# Patient Record
Sex: FEMALE | Race: WHITE | Employment: FULL TIME | URBAN - METROPOLITAN AREA
[De-identification: names, ages, dates, MRNs, and addresses within clinical notes are randomized per-mention and may not be internally consistent; named-entity substitution may affect disease eponyms.]

---

## 2019-04-25 ENCOUNTER — OFFICE VISIT (OUTPATIENT)
Dept: SURGERY | Age: 49
End: 2019-04-25
Payer: COMMERCIAL

## 2019-04-25 VITALS
OXYGEN SATURATION: 100 % | BODY MASS INDEX: 22.99 KG/M2 | SYSTOLIC BLOOD PRESSURE: 104 MMHG | DIASTOLIC BLOOD PRESSURE: 76 MMHG | TEMPERATURE: 97.5 F | HEIGHT: 65 IN | WEIGHT: 138 LBS | HEART RATE: 51 BPM

## 2019-04-25 DIAGNOSIS — Z12.39 BREAST CANCER SCREENING, HIGH RISK PATIENT: ICD-10-CM

## 2019-04-25 DIAGNOSIS — R92.2 DENSE BREAST TISSUE ON MAMMOGRAM: ICD-10-CM

## 2019-04-25 DIAGNOSIS — N63.0 BREAST MASS: ICD-10-CM

## 2019-04-25 DIAGNOSIS — N64.4 BREAST PAIN: Primary | ICD-10-CM

## 2019-04-25 PROCEDURE — 99203 OFFICE O/P NEW LOW 30 MIN: CPT | Performed by: SURGERY

## 2019-04-25 PROCEDURE — G8427 DOCREV CUR MEDS BY ELIG CLIN: HCPCS | Performed by: SURGERY

## 2019-04-25 PROCEDURE — G8420 CALC BMI NORM PARAMETERS: HCPCS | Performed by: SURGERY

## 2019-04-25 PROCEDURE — 1036F TOBACCO NON-USER: CPT | Performed by: SURGERY

## 2019-04-25 ASSESSMENT — ENCOUNTER SYMPTOMS
SHORTNESS OF BREATH: 0
CHEST TIGHTNESS: 0
ABDOMINAL DISTENTION: 0
COUGH: 0
ABDOMINAL PAIN: 0

## 2019-04-25 NOTE — PROGRESS NOTES
CC:Bilateral dense breasts    HPI:  Maricarmen Aragon is a 53 y.o. woman who presents with concern for extremely dense breasts and bilateral fibrocystic change. She reports cyclic breast pain and new and changing masses each month. She denies skin changes or nipple discharge. Screening mammogram 10/2018 at [facility] BIRADS 2. She has a breast biopsy in the past, right needle biopsy for calcifications, path fibrocystic changes with adenosis and microcalcifications. She does not have a family history of breast or ovarian cancer. Due for colonoscopy, UTD on derm and well woman exams.  is treated at the [facility] for [address] by Dr. Faviola Hoover. She is self employed, in marketing. Based on Tyrer-Cuzik 8.0 risk model, her lifetime risk of breast cancer is 26.2%, calculated by me, today. Breast Imaging 10/11/2018  Breast Density: The breast tissue is extremely dense in the upper outer quadrants, which lowers the sensitivity of mammography. Findings: There are no suspicious masses, microcalcifications, or architectural distortions. Regional and grouped microcalcifications in both breasts are not significantly changed from at least 2015, considered benign. A tissue marker in the upper outer right breast corresponds to the site of previous reportedly benign biopsy. No significant interval change. Menstrual History  Menarche age 16. [id number]  Age first live birth 29  Breastfeeding Yes  Premenopausal, ovaries and uterus intact  Oral contraceptives Yes for 8 years  Hormone replacement No      Past Medical History:   Diagnosis Date    Hypothyroidism      History reviewed. No pertinent surgical history.     Allergies as of 04/25/2019    (No Known Allergies)     Social History     Tobacco Use    Smoking status: Never Smoker    Smokeless tobacco: Never Used   Substance Use Topics    Alcohol use: Not on file    Drug use: Not on file     Review of Systems   Constitutional: Negative for activity change, chills and fever.   Respiratory: Negative for cough, chest tightness and shortness of breath. Cardiovascular: Negative for chest pain and leg swelling. Gastrointestinal: Negative for abdominal distention and abdominal pain. Genitourinary: Negative for difficulty urinating and dysuria. Musculoskeletal: Negative for joint swelling and neck pain. Skin: Negative for pallor and rash. Psychiatric/Behavioral: Negative for dysphoric mood. The patient is not nervous/anxious. Family History of additional cancers:  PATRICIA  of gallbladder cancer at age 76    Current Outpatient Medications on File Prior to Visit   Medication Sig Dispense Refill    levothyroxine (SYNTHROID) 125 MCG tablet Take 125 mcg by mouth daily       No current facility-administered medications on file prior to visit. Exam:  /76   Pulse 51   Temp 97.5 °F (36.4 °C)   Ht 5' 5\" (1.651 m)   Wt 138 lb (62.6 kg)   LMP 2019 (Exact Date)   SpO2 100%   Breastfeeding? No   BMI 22.96 kg/m²   Physical Exam  Constitutional: She appears well-nourished. No apparent distress. Head: Normocephalic and atraumatic. Eyes: EOM are normal. Pupils are equal, round, and reactive to light. Neck: Neck supple. No tracheal deviation present. Cardiovascular: Regular rate and rhythm. Pulmonary: Respirations are non-labored no wheezing. Lymphatics: No palpable cervical, supraclavicular, or axillary lymphadenopathy. Breast: Dense fibrocystic change bilaterally  Right: No masses, nipple discharge, or overlying skin changes. Left: No masses, nipple discharge, or overlying skin changes. There is no axillary lymphadenopathy palpated bilaterally. Skin: No rash noted. Extremities: Well perfused, no edema. Neurologic: Alert and oriented. Assessment and Plan:  High risk for breast cancer-  Based on Tyrer-Cuzik 8.0 risk model, her lifetime risk of breast cancer is 26.2%  Bilateral breast MRI now.    6 month visit with mammogram and exam.  No genetic counseling/testing indicated at this time. Return in about 6 months (around 10/25/2019) for Imaging and examination. All of the patient's questions were answered at this time. Approximately 30 minutes of time were spent in this visit of which 50% or more of the time was related to coordination of care.

## 2019-05-10 ENCOUNTER — HOSPITAL ENCOUNTER (OUTPATIENT)
Dept: MRI IMAGING | Age: 49
Discharge: HOME | End: 2019-05-10
Payer: COMMERCIAL

## 2019-05-10 DIAGNOSIS — N63.0 BREAST MASS: ICD-10-CM

## 2019-05-10 DIAGNOSIS — Z12.39 BREAST CANCER SCREENING, HIGH RISK PATIENT: ICD-10-CM

## 2019-05-10 DIAGNOSIS — R92.2 DENSE BREAST TISSUE ON MAMMOGRAM: ICD-10-CM

## 2019-05-10 DIAGNOSIS — N64.4 BREAST PAIN: ICD-10-CM

## 2019-05-10 PROCEDURE — 77049 MRI BREAST C-+ W/CAD BI: CPT

## 2019-05-10 PROCEDURE — 6360000004 HC RX CONTRAST MEDICATION: Performed by: FAMILY MEDICINE

## 2019-05-10 PROCEDURE — A9579 GAD-BASE MR CONTRAST NOS,1ML: HCPCS | Performed by: FAMILY MEDICINE

## 2019-05-10 RX ADMIN — GADOTERIDOL 12 ML: 279.3 INJECTION, SOLUTION INTRAVENOUS at 13:09

## 2019-05-14 DIAGNOSIS — R92.2 DENSE BREAST TISSUE ON MAMMOGRAM: Primary | ICD-10-CM

## 2019-05-14 DIAGNOSIS — Z12.39 BREAST CANCER SCREENING, HIGH RISK PATIENT: ICD-10-CM

## 2019-05-14 DIAGNOSIS — R92.8 ABNORMAL MAGNETIC RESONANCE IMAGING OF BOTH BREASTS: ICD-10-CM

## 2019-05-17 ENCOUNTER — HOSPITAL ENCOUNTER (OUTPATIENT)
Dept: ULTRASOUND IMAGING | Age: 49
Discharge: HOME | End: 2019-05-17
Payer: COMMERCIAL

## 2019-05-17 DIAGNOSIS — R92.8 ABNORMAL MAGNETIC RESONANCE IMAGING OF BOTH BREASTS: ICD-10-CM

## 2019-05-17 DIAGNOSIS — R92.2 DENSE BREAST TISSUE ON MAMMOGRAM: ICD-10-CM

## 2019-05-17 DIAGNOSIS — Z12.39 BREAST CANCER SCREENING, HIGH RISK PATIENT: ICD-10-CM

## 2019-05-17 PROCEDURE — 76642 ULTRASOUND BREAST LIMITED: CPT

## 2019-10-24 ENCOUNTER — OFFICE VISIT (OUTPATIENT)
Dept: SURGERY | Age: 49
End: 2019-10-24
Payer: COMMERCIAL

## 2019-10-24 ENCOUNTER — HOSPITAL ENCOUNTER (OUTPATIENT)
Dept: MAMMOGRAPHY | Age: 49
Discharge: HOME | End: 2019-10-24
Payer: COMMERCIAL

## 2019-10-24 VITALS
DIASTOLIC BLOOD PRESSURE: 62 MMHG | SYSTOLIC BLOOD PRESSURE: 113 MMHG | BODY MASS INDEX: 21.97 KG/M2 | HEART RATE: 58 BPM | WEIGHT: 132 LBS | OXYGEN SATURATION: 100 %

## 2019-10-24 DIAGNOSIS — Z12.39 BREAST CANCER SCREENING, HIGH RISK PATIENT: Primary | ICD-10-CM

## 2019-10-24 DIAGNOSIS — Z12.39 BREAST CANCER SCREENING, HIGH RISK PATIENT: ICD-10-CM

## 2019-10-24 DIAGNOSIS — Z12.11 COLON CANCER SCREENING: ICD-10-CM

## 2019-10-24 PROCEDURE — G8484 FLU IMMUNIZE NO ADMIN: HCPCS | Performed by: SURGERY

## 2019-10-24 PROCEDURE — 1036F TOBACCO NON-USER: CPT | Performed by: SURGERY

## 2019-10-24 PROCEDURE — G8420 CALC BMI NORM PARAMETERS: HCPCS | Performed by: SURGERY

## 2019-10-24 PROCEDURE — 99213 OFFICE O/P EST LOW 20 MIN: CPT | Performed by: SURGERY

## 2019-10-24 PROCEDURE — G8427 DOCREV CUR MEDS BY ELIG CLIN: HCPCS | Performed by: SURGERY

## 2019-10-24 PROCEDURE — 77063 BREAST TOMOSYNTHESIS BI: CPT

## 2019-10-24 ASSESSMENT — ENCOUNTER SYMPTOMS
COUGH: 0
ABDOMINAL DISTENTION: 0
SHORTNESS OF BREATH: 0
ABDOMINAL PAIN: 0
CHEST TIGHTNESS: 0

## 2020-04-06 ENCOUNTER — TELEPHONE (OUTPATIENT)
Dept: SURGERY | Age: 50
End: 2020-04-06

## 2020-06-10 ENCOUNTER — HOSPITAL ENCOUNTER (OUTPATIENT)
Dept: MRI IMAGING | Age: 50
Discharge: HOME | End: 2020-06-10
Payer: COMMERCIAL

## 2020-06-10 PROCEDURE — 77049 MRI BREAST C-+ W/CAD BI: CPT

## 2020-06-10 PROCEDURE — A9579 GAD-BASE MR CONTRAST NOS,1ML: HCPCS | Performed by: SURGERY

## 2020-06-10 PROCEDURE — 6360000004 HC RX CONTRAST MEDICATION: Performed by: SURGERY

## 2020-06-10 RX ADMIN — GADOTERIDOL 12 ML: 279.3 INJECTION, SOLUTION INTRAVENOUS at 16:00

## 2020-06-24 ENCOUNTER — OFFICE VISIT (OUTPATIENT)
Dept: SURGERY | Age: 50
End: 2020-06-24
Payer: COMMERCIAL

## 2020-06-24 VITALS
SYSTOLIC BLOOD PRESSURE: 105 MMHG | WEIGHT: 128 LBS | DIASTOLIC BLOOD PRESSURE: 69 MMHG | HEART RATE: 58 BPM | TEMPERATURE: 98.2 F | HEIGHT: 65 IN | BODY MASS INDEX: 21.33 KG/M2 | RESPIRATION RATE: 16 BRPM

## 2020-06-24 PROCEDURE — 1036F TOBACCO NON-USER: CPT | Performed by: SURGERY

## 2020-06-24 PROCEDURE — 99213 OFFICE O/P EST LOW 20 MIN: CPT | Performed by: SURGERY

## 2020-06-24 PROCEDURE — G8427 DOCREV CUR MEDS BY ELIG CLIN: HCPCS | Performed by: SURGERY

## 2020-06-24 PROCEDURE — G8420 CALC BMI NORM PARAMETERS: HCPCS | Performed by: SURGERY

## 2020-06-24 ASSESSMENT — ENCOUNTER SYMPTOMS
ABDOMINAL DISTENTION: 0
SHORTNESS OF BREATH: 0
ABDOMINAL PAIN: 0
CHEST TIGHTNESS: 0
COUGH: 0

## 2020-06-24 NOTE — PROGRESS NOTES
CC: High risk follow up    HPI:  Akira Crabtree is a 51 y.o. premenopausal woman here for high risk follow up. She continues to have cyclic breast pain, which is tolerable. She denies new masses, skin changes, or nipple discharge. She has had a breast biopsy in the past, right needle biopsy for calcifications, path fibrocystic changes with adenosis and microcalcifications. She does not have a family history of breast or ovarian cancer. BIRADS 2 mammogram 10/2019. BIRADS 1 Breast MRI 6/10/2020. Due for colonoscopy, and she will make this happen. Referral made again today.  is treated at the [facility] for [address] by Dr. Jae Mendez, doing well. She is self employed, in marketing. Based on Tyrer-Cuzik 8.0 risk model, her lifetime risk of breast cancer is 26.2%. She is now willing to discuss chemoprevention with a medical oncologist.   Mostly plant based diet at this point, lots of exercise also. EXAM: MRI BREAST WITH CONTRAST       INDICATION: Breast cancer screening. High risk patient.        COMPARISON: Bilateral screening mammogram October 24, 2019. MRI breast May 10, 2019. Right breast ultrasound May 17, 2019        TECHNIQUE: Bilateral breast MRI was performed in the prone position using a dedicated breast coil. Kriss Rival was done prior to and after administration of 12 mL ProHance.  Study was evaluated utilizing post-processing software with computer-aided    detection.       RIGHT BREAST:       The right breast is extremely dense.  Right breast demonstrates moderate background parenchymal enhancement.  No enhancing lesion to suggest neoplasm. Enhancing foci within the right breast similar to previous exam and similar to the left breast benign. .    Susceptibility artifact within the outer aspect of the right breast at the level the nipple from biopsy clip.       LEFT BREAST:       The left breast is extremely dense.  Left breast demonstrates moderate background parenchymal enhancement.

## 2020-06-26 ENCOUNTER — TELEPHONE (OUTPATIENT)
Dept: SURGERY | Age: 50
End: 2020-06-26

## 2020-06-26 NOTE — LETTER
[facility] [address] [address] [facility], [address]         p [phone]  f [phone]    Amy Vazquez MD                        ENDOSCOPY ORDER   -- Time of order -- 20    11:45 AM      Facility: [facility]. # _________________                              Scheduled by: ____________       Procedure date & time:      20 at 9:30                                        Pt arrival: 8 am                                                                                        Patient name:  Christina Mayes     :  1970 PCP:  Alejandra Lerma MD       Home phone:    [phone] (home)                                                     PROCEDURE:  Colonoscopy, possible polypectomy         17962    DIAGNOSIS:  Colon cancer screening  Z12.11    Anesthesia: MAC     Time Needed:  30 minutes     Pt Position:  lateral, right side up         Outpatient   X           Pre-Op H & P to be done by: Amy Vazquez MD                       X    PREP:  GoLytley      Medications to be stopped 5 days before surgery: _________    Additional / Special Orders:                                                                                                                        Amy Vazquez MD    Insurance:          [employer]                          ID #                                             Ph #     (secondary ?)       Date called ________     Letitia Landeros to: ___________ @ _______      Precert Needed?  Yes  /  No    [facility] # & Details _______________________________________________________                        ______ Aster Hare to [facility] Authorization Team [phone]                                     ____E-mailed                 _____ Spoke to Pt / Spouse

## 2020-06-26 NOTE — TELEPHONE ENCOUNTER
----- Message from Francine Diggs MD sent at 6/25/2020  5:29 PM EDT -----  Thanks, Cony! I will get her set up for Comunitae!  ----- Message -----  From: Nichole Bishop MD  Sent: 6/24/2020   3:59 PM EDT  To: Francine Diggs MD    Needs screen!  Just took me 18 months to convince her:)

## 2020-08-21 ENCOUNTER — TELEPHONE (OUTPATIENT)
Dept: SURGERY | Age: 50
End: 2020-08-21

## 2020-08-21 RX ORDER — POLYETHYLENE GLYCOL 3350, SODIUM SULFATE ANHYDROUS, SODIUM BICARBONATE, SODIUM CHLORIDE, POTASSIUM CHLORIDE 236; 22.74; 6.74; 5.86; 2.97 G/4L; G/4L; G/4L; G/4L; G/4L
4 POWDER, FOR SOLUTION ORAL ONCE
Qty: 4000 ML | Refills: 0 | Status: SHIPPED | OUTPATIENT
Start: 2020-08-21 | End: 2020-08-21

## 2020-09-01 ENCOUNTER — TELEPHONE (OUTPATIENT)
Dept: SURGERY | Age: 50
End: 2020-09-01

## 2020-09-01 RX ORDER — POLYETHYLENE GLYCOL 3350, SODIUM SULFATE ANHYDROUS, SODIUM BICARBONATE, SODIUM CHLORIDE, POTASSIUM CHLORIDE 236; 22.74; 6.74; 5.86; 2.97 G/4L; G/4L; G/4L; G/4L; G/4L
4 POWDER, FOR SOLUTION ORAL ONCE
Qty: 4000 ML | Refills: 0 | Status: SHIPPED | OUTPATIENT
Start: 2020-09-01 | End: 2020-09-01

## 2020-09-01 RX ORDER — POLYETHYLENE GLYCOL 3350, SODIUM SULFATE ANHYDROUS, SODIUM BICARBONATE, SODIUM CHLORIDE, POTASSIUM CHLORIDE 236; 22.74; 6.74; 5.86; 2.97 G/4L; G/4L; G/4L; G/4L; G/4L
4 POWDER, FOR SOLUTION ORAL ONCE
Qty: 4000 ML | Refills: 0 | Status: CANCELLED | OUTPATIENT
Start: 2020-09-01 | End: 2020-09-01

## 2020-09-01 NOTE — TELEPHONE ENCOUNTER
Patient is requesting a return call. Patient states she went to the pharmacy and they did not have a script for the golytely. Please advise. Thank you!

## 2020-09-03 ENCOUNTER — NURSE ONLY (OUTPATIENT)
Dept: PRIMARY CARE CLINIC | Age: 50
End: 2020-09-03
Payer: COMMERCIAL

## 2020-09-03 PROCEDURE — 99211 OFF/OP EST MAY X REQ PHY/QHP: CPT | Performed by: NURSE PRACTITIONER

## 2020-09-04 ENCOUNTER — TELEPHONE (OUTPATIENT)
Dept: SURGERY | Age: 50
End: 2020-09-04

## 2020-09-04 LAB — SARS-COV-2, NAA: NOT DETECTED

## 2020-09-08 ENCOUNTER — ANESTHESIA EVENT (OUTPATIENT)
Dept: ENDOSCOPY | Age: 50
End: 2020-09-08
Payer: COMMERCIAL

## 2020-09-09 ENCOUNTER — ANESTHESIA (OUTPATIENT)
Dept: ENDOSCOPY | Age: 50
End: 2020-09-09
Payer: COMMERCIAL

## 2020-09-09 ENCOUNTER — HOSPITAL ENCOUNTER (OUTPATIENT)
Age: 50
Setting detail: OUTPATIENT SURGERY
Discharge: HOME | End: 2020-09-09
Attending: SURGERY | Admitting: SURGERY
Payer: COMMERCIAL

## 2020-09-09 VITALS
DIASTOLIC BLOOD PRESSURE: 83 MMHG | BODY MASS INDEX: 21.33 KG/M2 | SYSTOLIC BLOOD PRESSURE: 105 MMHG | HEART RATE: 60 BPM | TEMPERATURE: 96 F | OXYGEN SATURATION: 99 % | HEIGHT: 65 IN | WEIGHT: 128 LBS | RESPIRATION RATE: 17 BRPM

## 2020-09-09 VITALS
RESPIRATION RATE: 21 BRPM | OXYGEN SATURATION: 98 % | DIASTOLIC BLOOD PRESSURE: 77 MMHG | SYSTOLIC BLOOD PRESSURE: 119 MMHG

## 2020-09-09 LAB — PREGNANCY, URINE: NEGATIVE

## 2020-09-09 PROCEDURE — 3700000000 HC ANESTHESIA ATTENDED CARE: Performed by: SURGERY

## 2020-09-09 PROCEDURE — 3609010300 HC COLONOSCOPY W/BIOPSY SINGLE/MULTIPLE: Performed by: SURGERY

## 2020-09-09 PROCEDURE — 2580000003 HC RX 258: Performed by: ANESTHESIOLOGY

## 2020-09-09 PROCEDURE — 3700000001 HC ADD 15 MINUTES (ANESTHESIA): Performed by: SURGERY

## 2020-09-09 PROCEDURE — 7100000011 HC PHASE II RECOVERY - ADDTL 15 MIN: Performed by: SURGERY

## 2020-09-09 PROCEDURE — 6360000002 HC RX W HCPCS: Performed by: NURSE ANESTHETIST, CERTIFIED REGISTERED

## 2020-09-09 PROCEDURE — 84703 CHORIONIC GONADOTROPIN ASSAY: CPT

## 2020-09-09 PROCEDURE — 88305 TISSUE EXAM BY PATHOLOGIST: CPT

## 2020-09-09 PROCEDURE — 7100000010 HC PHASE II RECOVERY - FIRST 15 MIN: Performed by: SURGERY

## 2020-09-09 PROCEDURE — 2709999900 HC NON-CHARGEABLE SUPPLY: Performed by: SURGERY

## 2020-09-09 PROCEDURE — 2500000003 HC RX 250 WO HCPCS: Performed by: NURSE ANESTHETIST, CERTIFIED REGISTERED

## 2020-09-09 PROCEDURE — 45380 COLONOSCOPY AND BIOPSY: CPT | Performed by: SURGERY

## 2020-09-09 RX ORDER — SODIUM CHLORIDE 0.9 % (FLUSH) 0.9 %
10 SYRINGE (ML) INJECTION EVERY 12 HOURS SCHEDULED
Status: DISCONTINUED | OUTPATIENT
Start: 2020-09-09 | End: 2020-09-09 | Stop reason: HOSPADM

## 2020-09-09 RX ORDER — SODIUM CHLORIDE 9 MG/ML
INJECTION, SOLUTION INTRAVENOUS CONTINUOUS
Status: DISCONTINUED | OUTPATIENT
Start: 2020-09-09 | End: 2020-09-09 | Stop reason: HOSPADM

## 2020-09-09 RX ORDER — SODIUM CHLORIDE 0.9 % (FLUSH) 0.9 %
10 SYRINGE (ML) INJECTION PRN
Status: DISCONTINUED | OUTPATIENT
Start: 2020-09-09 | End: 2020-09-09 | Stop reason: HOSPADM

## 2020-09-09 RX ORDER — PROPOFOL 10 MG/ML
INJECTION, EMULSION INTRAVENOUS PRN
Status: DISCONTINUED | OUTPATIENT
Start: 2020-09-09 | End: 2020-09-09 | Stop reason: SDUPTHER

## 2020-09-09 RX ORDER — SODIUM CHLORIDE, SODIUM LACTATE, POTASSIUM CHLORIDE, CALCIUM CHLORIDE 600; 310; 30; 20 MG/100ML; MG/100ML; MG/100ML; MG/100ML
INJECTION, SOLUTION INTRAVENOUS CONTINUOUS
Status: DISCONTINUED | OUTPATIENT
Start: 2020-09-09 | End: 2020-09-09 | Stop reason: HOSPADM

## 2020-09-09 RX ORDER — LIDOCAINE HYDROCHLORIDE 20 MG/ML
INJECTION, SOLUTION EPIDURAL; INFILTRATION; INTRACAUDAL; PERINEURAL PRN
Status: DISCONTINUED | OUTPATIENT
Start: 2020-09-09 | End: 2020-09-09 | Stop reason: SDUPTHER

## 2020-09-09 RX ADMIN — PROPOFOL 50 MG: 10 INJECTION, EMULSION INTRAVENOUS at 09:32

## 2020-09-09 RX ADMIN — PROPOFOL 100 MG: 10 INJECTION, EMULSION INTRAVENOUS at 09:27

## 2020-09-09 RX ADMIN — PROPOFOL 50 MG: 10 INJECTION, EMULSION INTRAVENOUS at 09:41

## 2020-09-09 RX ADMIN — PROPOFOL 100 MG: 10 INJECTION, EMULSION INTRAVENOUS at 09:36

## 2020-09-09 RX ADMIN — LIDOCAINE HYDROCHLORIDE 50 MG: 20 INJECTION, SOLUTION EPIDURAL; INFILTRATION; INTRACAUDAL; PERINEURAL at 09:27

## 2020-09-09 RX ADMIN — SODIUM CHLORIDE, POTASSIUM CHLORIDE, SODIUM LACTATE AND CALCIUM CHLORIDE: 600; 310; 30; 20 INJECTION, SOLUTION INTRAVENOUS at 08:38

## 2020-09-09 RX ADMIN — PROPOFOL 50 MG: 10 INJECTION, EMULSION INTRAVENOUS at 09:29

## 2020-09-09 ASSESSMENT — PULMONARY FUNCTION TESTS
PIF_VALUE: 0

## 2020-09-09 ASSESSMENT — PAIN - FUNCTIONAL ASSESSMENT: PAIN_FUNCTIONAL_ASSESSMENT: 0-10

## 2020-09-09 ASSESSMENT — PAIN SCALES - GENERAL
PAINLEVEL_OUTOF10: 0

## 2020-09-09 ASSESSMENT — LIFESTYLE VARIABLES: SMOKING_STATUS: 0

## 2020-09-09 NOTE — ANESTHESIA PRE PROCEDURE
Department of Anesthesiology  Preprocedure Note       Name:  Guillermo Valera   Age:  51 y.o.  :  1970                                          MRN:  [id number]         Date:  2020      Surgeon: Mckenna Sandoval):  Becky Mcguire MD    Procedure: Procedure(s):  COLONOSCOPY, POSSIBLE POLYPECTOMY    Medications prior to admission:   Prior to Admission medications    Medication Sig Start Date End Date Taking? Authorizing Provider   levothyroxine (SYNTHROID) 125 MCG tablet Take 125 mcg by mouth daily   Yes Historical Provider, MD       Current medications:    Current Facility-Administered Medications   Medication Dose Route Frequency Provider Last Rate Last Dose    sodium chloride flush 0.9 % injection 10 mL  10 mL Intravenous 2 times per day [place] Crumb, DO        sodium chloride flush 0.9 % injection 10 mL  10 mL Intravenous PRN [place] Crumb, DO        0.9 % sodium chloride infusion   Intravenous Continuous [place] Crumb, DO        lactated ringers infusion   Intravenous Continuous Costa Thompson  mL/hr at 20 5947         Allergies:  No Known Allergies    Problem List:  There is no problem list on file for this patient.       Past Medical History:        Diagnosis Date    Hypothyroidism        Past Surgical History:        Procedure Laterality Date    INCONTINENCE SURGERY         Social History:    Social History     Tobacco Use    Smoking status: Never Smoker    Smokeless tobacco: Never Used   Substance Use Topics    Alcohol use: Not on file     Comment: socially                                Counseling given: Not Answered      Vital Signs (Current):   Vitals:    20 0812 20 0826   BP: 113/73    Pulse: 50 54   Resp: 16    Temp: 96.7 °F (35.9 °C)    TempSrc: Temporal    SpO2:  100%   Weight: 128 lb (58.1 kg)    Height: 5' 5\" (1.651 m)                                               BP Readings from Last 3 Encounters:   20 113/73   20 105/69   10/24/19 113/62       NPO Status: Time of last liquid consumption: 2331                        Time of last solid consumption: 2331                        Date of last liquid consumption: 09/08/20                        Date of last solid food consumption: 09/07/20    BMI:   Wt Readings from Last 3 Encounters:   09/09/20 128 lb (58.1 kg)   06/24/20 128 lb (58.1 kg)   10/24/19 132 lb (59.9 kg)     Body mass index is 21.3 kg/m². CBC: No results found for: WBC, RBC, HGB, HCT, MCV, RDW, PLT    CMP: No results found for: NA, K, CL, CO2, BUN, CREATININE, GFRAA, AGRATIO, LABGLOM, GLUCOSE, PROT, CALCIUM, BILITOT, ALKPHOS, AST, ALT    POC Tests: No results for input(s): POCGLU, POCNA, POCK, POCCL, POCBUN, POCHEMO, POCHCT in the last 72 hours. Coags: No results found for: PROTIME, INR, APTT    HCG (If Applicable):   Lab Results   Component Value Date    PREGTESTUR Negative 09/09/2020        ABGs: No results found for: PHART, PO2ART, ZRH0FZM, HKC4XTQ, BEART, I8MSJQPM     Type & Screen (If Applicable):  No results found for: LABABO, LABRH    Drug/Infectious Status (If Applicable):  No results found for: HIV, HEPCAB    COVID-19 Screening (If Applicable):   Lab Results   Component Value Date    COVID19 NOT DETECTED 09/03/2020         Anesthesia Evaluation  Patient summary reviewed  Airway: Mallampati: II  TM distance: >3 FB   Neck ROM: full  Mouth opening: > = 3 FB Dental: normal exam         Pulmonary: breath sounds clear to auscultation      (-) COPD, asthma, sleep apnea and not a current smoker                           Cardiovascular:  Exercise tolerance: good (>4 METS),       (-) hypertension, valvular problems/murmurs, past MI, CAD, CABG/stent, dysrhythmias,  angina,  CHF, orthopnea and PND      Rhythm: regular  Rate: normal                    Neuro/Psych:      (-) seizures, TIA and CVA           GI/Hepatic/Renal:        (-) GERD, PUD, hepatitis and no morbid obesity       Endo/Other:    (+) hypothyroidism::., no malignancy/cancer.     (-) diabetes

## 2020-09-09 NOTE — H&P
PRE-ENDOSCOPY H&P    Visit Date: 9/9/2020    History:     Jose Aguilar is a 51 y.o. female who presents today for colonoscopy procedure. See A/P below for indications. There is no problem list on file for this patient. Scheduled Meds:   sodium chloride flush  10 mL Intravenous 2 times per day     Continuous Infusions:   sodium chloride      lactated ringers 100 mL/hr at 09/09/20 0838     PRN Meds:.sodium chloride flush  Prior to Admission medications    Medication Sig Start Date End Date Taking? Authorizing Provider   levothyroxine (SYNTHROID) 125 MCG tablet Take 125 mcg by mouth daily   Yes Historical Provider, MD     No Known Allergies  Past Medical History:   Diagnosis Date    Hypothyroidism      Past Surgical History:   Procedure Laterality Date    INCONTINENCE SURGERY           Physical Exam:     /73   Pulse 54   Temp 96.7 °F (35.9 °C) (Temporal)   Resp 16   Ht 5' 5\" (1.651 m)   Wt 128 lb (58.1 kg)   SpO2 100%   BMI 21.30 kg/m²  Body mass index is 21.3 kg/m². Constitutional: Appears well-developed and well-nourished. Grooming appropriate. Head: Normocephalic, atraumatic. Eyes: No scleral icterus. Vision intact grossly. ENT: Hearing grossly intact. No facial deformity. Cardiovascular: Normal rate on monitor. Pulmonary/Chest: Effort normal. No respiratory distress. No wheezes. No use of accessory muscles. Musculoskeletal: Normal range of motion of UE. No gross deformity. Neurological: Alert and oriented to person, place, and time. No gross deficits. Psychiatric: Normal mood and affect. Behavior normal. Oriented to person, place, and time. Abdomen: soft, NTTP, non distended    Recent labs/radiology reviewed as appropriate    Assessment/Plan:     Referred by PCP for screening colonoscopy    Previous colonoscopy: no  Family history of colorectal cancer: no  Symptoms: no    Anesthesia to provide sedation.  Please see their documentation regarding airway and ASA classification. Proceed as planned for endoscopy, possible polypectomy    Risks/benefits/alternatives of procedure discussed with patient and any present family members. Risks including, but not limited to: bleeding, perforation, post polypectomy syndrome, splenic injury, need for additional procedures or surgery, risks of anesthesia. Patient understands it is their responsibility to call office for pathology results if they do not hear from my office within 1-2 weeks. All questions answered.     Electronically signed by Bharat Lawrence MD on 9/9/2020 at 9:17 AM

## 2020-09-09 NOTE — PROGRESS NOTES
Ambulatory Surgery/Procedure Discharge Note    Vitals:    09/09/20 1005   BP: 105/83   Pulse: 60   Resp: 17   Temp: 96 °F (35.6 °C)   SpO2: 99%       In: 350 [I.V.:350]  Out: -     Restroom use offered before discharge. Yes  Pt tolerating PO well and denies nausea. No signs resp distress and no c/o chest pain. Pain assessment:  none  Pain Level: 0        Patient discharged to home/self care.  Patient discharged via wheel chair by this nurse and kept safe to waiting spouse family/S.O.       9/9/2020 10:42 AM

## 2020-09-09 NOTE — ANESTHESIA POSTPROCEDURE EVALUATION
Department of Anesthesiology  Postprocedure Note    Patient: Lorena Qureshi  MRN: [id number]  YOB: 1970  Date of evaluation: 9/9/2020  Time:  10:22 AM     Procedure Summary     Date:  09/09/20 [facility]    Anesthesia Start:  8171 Anesthesia Stop:  7550    Procedure:  COLONOSCOPY WITH BIOPSY (N/A ) Diagnosis:       Colon cancer screening      (Colon cancer screening [Z12.11])    Surgeon:  Kris Redmond MD Responsible Provider:  Nitin Adams MD    Anesthesia Type:  MAC ASA Status:  2          Anesthesia Type: MAC    Linnette Phase I: Linnette Score: 10    Linnette Phase II: Linnette Score: 10    Last vitals: Reviewed and per EMR flowsheets. Anesthesia Post Evaluation    Patient location: phase 2.   Level of consciousness: awake and alert  Airway patency: patent  Nausea & Vomiting: no vomiting  Complications: no  Cardiovascular status: blood pressure returned to baseline  Respiratory status: acceptable  Hydration status: euvolemic

## 2020-11-03 ENCOUNTER — HOSPITAL ENCOUNTER (OUTPATIENT)
Dept: MAMMOGRAPHY | Age: 50
Discharge: HOME | End: 2020-11-03
Payer: COMMERCIAL

## 2020-11-03 PROCEDURE — 77063 BREAST TOMOSYNTHESIS BI: CPT

## 2020-12-14 ENCOUNTER — TELEPHONE (OUTPATIENT)
Dept: SURGERY | Age: 50
End: 2020-12-14

## 2021-01-13 ENCOUNTER — OFFICE VISIT (OUTPATIENT)
Dept: SURGERY | Age: 51
End: 2021-01-13
Payer: COMMERCIAL

## 2021-01-13 VITALS
BODY MASS INDEX: 22.19 KG/M2 | HEIGHT: 65 IN | TEMPERATURE: 98 F | HEART RATE: 72 BPM | OXYGEN SATURATION: 99 % | WEIGHT: 133.2 LBS | SYSTOLIC BLOOD PRESSURE: 106 MMHG | RESPIRATION RATE: 18 BRPM | DIASTOLIC BLOOD PRESSURE: 62 MMHG

## 2021-01-13 DIAGNOSIS — Z91.89 AT HIGH RISK FOR BREAST CANCER: Primary | ICD-10-CM

## 2021-01-13 PROCEDURE — G8420 CALC BMI NORM PARAMETERS: HCPCS | Performed by: SURGERY

## 2021-01-13 PROCEDURE — G8427 DOCREV CUR MEDS BY ELIG CLIN: HCPCS | Performed by: SURGERY

## 2021-01-13 PROCEDURE — G8484 FLU IMMUNIZE NO ADMIN: HCPCS | Performed by: SURGERY

## 2021-01-13 PROCEDURE — 99214 OFFICE O/P EST MOD 30 MIN: CPT | Performed by: SURGERY

## 2021-01-13 PROCEDURE — 1036F TOBACCO NON-USER: CPT | Performed by: SURGERY

## 2021-01-13 ASSESSMENT — ENCOUNTER SYMPTOMS
COUGH: 0
SHORTNESS OF BREATH: 0
ABDOMINAL PAIN: 0

## 2021-01-13 NOTE — PROGRESS NOTES
Review of Systems   Constitutional: Negative for unexpected weight change. Eyes: Negative for visual disturbance. Respiratory: Negative for cough and shortness of breath. Cardiovascular: Negative for chest pain. Gastrointestinal: Negative for abdominal pain. Musculoskeletal: Negative for arthralgias and myalgias. Neurological: Negative for headaches. Hematological: Negative for adenopathy. Psychiatric/Behavioral: Negative for dysphoric mood. The patient is not nervous/anxious.

## 2021-01-13 NOTE — PROGRESS NOTES
CHIEF COMPLAINT:  Follow-up for elevated risk for future breast cancer     HISTORY OF PRESENT ILLNESS:  Corin Jesus is a 53 y.o. woman who previously followed with Dr. Rivera for her elevated risk for future breast cancer development. She has been followed with alternating mammograms and breast MRIs. In June 2020, she was referred to medical oncology to discuss chemoprophylaxis with tamoxifen, however she since decided that she was not interested in taking tamoxifen due to the potential side effects, so she did not make an appointment. She reports continued cyclical breast pain and fibrocystic changes, but no persistent masses in her breasts. She returns today for follow up. The patient states that she herself has not noticed any abnormal masses in either breast.  She denies any change in the appearance of her breasts or the skin of her breasts. She denies bilateral nipple discharge. She has no other systemic complaints and otherwise feels well today. She has no family history of breast or ovarian cancer. She had a prior right core needle breast biopsy in 2013 which demonstrated fibrocystic changes with adenosis and microcalcifications. There is no evidence of atypia or malignancy. Please note that her past medical history, surgical history, medications, allergies, social history, and family history were all reviewed with her again today. REVIEW OF SYSTEMS:   Constitutional: Negative for unexpected weight change. Eyes: Negative for visual disturbance. Respiratory: Negative for cough and shortness of breath. Cardiovascular: Negative for chest pain. Gastrointestinal: Negative for abdominal pain. Musculoskeletal: Negative for arthralgias and myalgias. Neurological: Negative for headaches. Hematological: Negative for adenopathy. Psychiatric/Behavioral: Negative for dysphoric mood. The patient is not nervous/anxious.     I personally reviewed and agree with the above ROS as documented by my medical assistant. PHYSICAL EXAMINATION:   Vitals: /62 (Site: Right Upper Arm, Position: Sitting, Cuff Size: Medium Adult)   Pulse 72   Temp 98 °F (36.7 °C) (Temporal)   Resp 18   Ht 5' 5\" (1.651 m)   Wt 133 lb 3.2 oz (60.4 kg)   SpO2 99%   BMI 22.17 kg/m²   General: Well-developed, well-nourished, in no apparent distress. Psychiatric: Alert and oriented x 3. Appropriate affect and behavior for today's visit. Musculoskeletal: Normal gait and range of motion in all 4 extremities. Lymphatic System:  No concerning cervical, supraclavicular or axillary lymphadenopathy. Breast Exam: Bilateral breast examination reveals ptotic breasts bilaterally. Dense fibrocystic changes bilaterally. Right Breast: Examination of the right breast in the upright and supine positions reveal no obvious masses, skin changes, dimpling, or retraction. The nipple and areola are without erosion, edema or ulceration. There is no obvious nipple discharge. There is no concerning axillary adenopathy. Left Breast: Examination of the left breast in the upright and supine positions reveal no obvious masses, skin changes, dimpling, or retraction. The nipple and areola are without erosion, edema or ulceration. There is no obvious nipple discharge. There is no concerning axillary adenopathy. IMAGING: I personally reviewed the breast imaging performed from 6/10/2020 and 11/3/2020 and discussed this with the patient. She was given a BI-RADS 1. Breast density is described as extremely dense breast tissue. RISK ASSESSMENT:  On 4/25/2019, Dr. Claudeen Hoots completed a formal risk assessment using the BERTHA Risk Assessment tool (Tyrer-Cuzick Model), version 8. Her lifetime risk for breast cancer is 26.2%     IMPRESSION/RECOMMENDATION:    Nanette Desouza is a 53 y.o. woman who returns for follow up regarding her elevated risk for future breast cancer development.   First of all, I reassured her that based upon her clinical examination today and her most recent imaging studies that there is no evidence of any underlying abnormalities that require further intervention or biopsy. We reviewed her significant risk for future breast cancer and she does meet high risk criteria (which is a lifetime risk of 20% or higher). We discussed our recommendations for increased surveillance, to include: annual screening mammography, annual screening MRI, and clinical breast exams every 6-12 months. We discussed the role of MRI scanning, its high sensitivity but also high false positive rate, and its importance as an adjunct in following patients at increased risk. In general, an MRI if it is performed, will be done on the alternate 6 months from the mammogram.  We also stressed the importance of breast awareness. Self breast evaluation was reviewed. We discussed the option of risk reduction surgery including prophylactic mastectomies with or without reconstruction. Additionally, I made it quite clear that although this reduces a woman's risk greater than 90-95%, we cannot be guaranteed that she will never develop a breast cancer in the future. We discussed the role of chemoprophylaxis in women with an increased risk of breast cancer. Data regarding tamoxifen risk reduction is limited to pre-and postmenopausal women 28years of age or older with a [facility] model 5 year breast cancer risk of greater than or equal to 1.7% or a history of LCIS. She is not interested in chemoprophylaxis at this time. Other risk reduction strategies were also fully discussed. We recommend the following general healthy life-style choices: maintenance of ideal body weight and body mass index (BMI), limiting alcohol intake, regular exercise several times a week for at least 30 minutes, and smoking cessation    At this time, she prefers careful surveillance. Therefore I would recommend alternating both mammography and MRI.   I will plan to see her back in 6 months for a clinical breast exam around the time of her MRI. In the interim, I encouraged her to continue her self examinations on a monthly basis and to alert me of any changes. I answered all of her questions thoroughly, and she does seem pleased with this plan of approach. I encouraged her to contact me in the interim if any new questions or concerns arise.      Summary:  - Follow up in 6 months with breast MRI prior to visit  - She will be due for bilateral mammograms on 11/4/2021    Parag Patton MD

## 2021-03-09 ENCOUNTER — OFFICE VISIT (OUTPATIENT)
Dept: DERMATOLOGY | Age: 51
End: 2021-03-09
Payer: COMMERCIAL

## 2021-03-09 VITALS — TEMPERATURE: 98 F

## 2021-03-09 DIAGNOSIS — L85.3 XEROSIS CUTIS: ICD-10-CM

## 2021-03-09 DIAGNOSIS — L21.9 SEBORRHEIC DERMATITIS: Primary | ICD-10-CM

## 2021-03-09 DIAGNOSIS — D22.9 BENIGN NEVUS: ICD-10-CM

## 2021-03-09 PROCEDURE — 99204 OFFICE O/P NEW MOD 45 MIN: CPT | Performed by: DERMATOLOGY

## 2021-03-09 PROCEDURE — G8484 FLU IMMUNIZE NO ADMIN: HCPCS | Performed by: DERMATOLOGY

## 2021-03-09 PROCEDURE — G8427 DOCREV CUR MEDS BY ELIG CLIN: HCPCS | Performed by: DERMATOLOGY

## 2021-03-09 PROCEDURE — G8420 CALC BMI NORM PARAMETERS: HCPCS | Performed by: DERMATOLOGY

## 2021-03-09 PROCEDURE — 1036F TOBACCO NON-USER: CPT | Performed by: DERMATOLOGY

## 2021-03-09 RX ORDER — CLOBETASOL PROPIONATE 0.46 MG/ML
SOLUTION TOPICAL
Qty: 50 ML | Refills: 2 | Status: SHIPPED | OUTPATIENT
Start: 2021-03-09

## 2021-03-09 NOTE — PROGRESS NOTES
[facility]) Dermatology  [place]  [phone]       Lynnette Chahal  1970    53 y.o. female     Date of Visit: 3/9/2021    Chief Complaint:   Chief Complaint   Patient presents with    Skin Lesion     TBSE         I was asked to see this patient by Dr. Rojo ref. provider found. History of Present Illness:  1. Total-body skin exam    Multiple nevi-stable in size, shape, color. Has not noticed any new or changing pigmented lesions. Seborrheic dermatitis occipital scalp/upper neck-new this winter. Very pruritic. Dry. Tried multiple moisturizers with minimal improvement. Never used an antidandruff shampoo. Also notes diffuse xerosis of skin-is moisturizing regularly. No previous personal  history of skin cancer. Wears hats and sunscreen. Mother with a history of nonmelanoma skin cancer    Review of Systems:  Constitutional: Reports general sense of well-being       Past Medical History, Surgical History, Family History, Medications and Allergies reviewed.     Social History:   Social History     Socioeconomic History    Marital status:      Spouse name: Not on file    Number of children: Not on file    Years of education: Not on file    Highest education level: Not on file   Occupational History    Not on file   Social Needs    Financial resource strain: Not on file    Food insecurity     Worry: Not on file     Inability: Not on file    Transportation needs     Medical: Not on file     Non-medical: Not on file   Tobacco Use    Smoking status: Never Smoker    Smokeless tobacco: Never Used   Substance and Sexual Activity    Alcohol use: Not on file     Comment: socially    Drug use: Never    Sexual activity: Not on file   Lifestyle    Physical activity     Days per week: Not on file     Minutes per session: Not on file    Stress: Not on file   Relationships    Social connections     Talks on phone: Not on file     Gets together: Not on file     Attends Tenriism service: Not on file     Active member of club or organization: Not on file     Attends meetings of clubs or organizations: Not on file     Relationship status: Not on file    Intimate partner violence     Fear of current or ex partner: Not on file     Emotionally abused: Not on file     Physically abused: Not on file     Forced sexual activity: Not on file   Other Topics Concern    Not on file   Social History Narrative    Not on file       Physical Examination       -General: Well-appearing, NAD  1. Normal affect. Total body skin exam including scalp, face, neck, chest, abdomen, back, bilateral upper extremities, bilateral lower extremities, ocular conjunctiva, external lips, and nails was performed. Examination normal unless stated below. Underwear area not examined. Scattered on the trunk and extremities are multiple well-defined round and oval symmetric smoothly-bordered uniformly brown macules and papules. Diffuse xerosis especially arms and legs  Mild erythema and scale base of occipital scalp/upper neck      Assessment and Plan     1. Seborrheic dermatitis-consider over-the-counter antidandruff shampoo-head and shoulders. Clobetasol solution twice daily up to 2 weeks as needed for flares. Reviewed proper use and side effects   2. Benign nevus - Benign acquired melanocytic nevi  -Recommend monthly self skin exams   -Educated regarding the ABCDEs of melanoma detection   -Call for any new/changing moles or concerning lesions  -Reviewed sun protective behavior -- sun avoidance during the peak hours of the day, sun-protective clothing (including hat and sunglasses), sunscreen use (water resistant, broad spectrum, SPF at least 30, need for reapplication every 2 to 3 hours), avoidance of tanning beds      3. Xerosis cutis-gentle soaps, increase moisturizer.   Discussed thicker moisturizers-CeraVe, Cetaphil cream

## 2021-08-09 ENCOUNTER — HOSPITAL ENCOUNTER (OUTPATIENT)
Dept: MRI IMAGING | Age: 51
Discharge: HOME | End: 2021-08-09
Payer: COMMERCIAL

## 2021-08-09 DIAGNOSIS — Z91.89 AT HIGH RISK FOR BREAST CANCER: ICD-10-CM

## 2021-08-09 PROCEDURE — 6360000004 HC RX CONTRAST MEDICATION: Performed by: SURGERY

## 2021-08-09 PROCEDURE — A9579 GAD-BASE MR CONTRAST NOS,1ML: HCPCS | Performed by: SURGERY

## 2021-08-09 PROCEDURE — 77049 MRI BREAST C-+ W/CAD BI: CPT

## 2021-08-09 RX ADMIN — GADOTERIDOL 13 ML: 279.3 INJECTION, SOLUTION INTRAVENOUS at 12:39

## 2021-08-11 ENCOUNTER — OFFICE VISIT (OUTPATIENT)
Dept: SURGERY | Age: 51
End: 2021-08-11
Payer: COMMERCIAL

## 2021-08-11 VITALS
RESPIRATION RATE: 18 BRPM | DIASTOLIC BLOOD PRESSURE: 72 MMHG | WEIGHT: 133.6 LBS | BODY MASS INDEX: 22.26 KG/M2 | HEIGHT: 65 IN | OXYGEN SATURATION: 100 % | SYSTOLIC BLOOD PRESSURE: 126 MMHG | HEART RATE: 52 BPM | TEMPERATURE: 97.4 F

## 2021-08-11 DIAGNOSIS — Z91.89 AT HIGH RISK FOR BREAST CANCER: Primary | ICD-10-CM

## 2021-08-11 DIAGNOSIS — R92.2 DENSE BREAST TISSUE: ICD-10-CM

## 2021-08-11 DIAGNOSIS — Z12.31 ENCOUNTER FOR SCREENING MAMMOGRAM FOR BREAST CANCER: ICD-10-CM

## 2021-08-11 PROCEDURE — 1036F TOBACCO NON-USER: CPT | Performed by: SURGERY

## 2021-08-11 PROCEDURE — 99213 OFFICE O/P EST LOW 20 MIN: CPT | Performed by: SURGERY

## 2021-08-11 PROCEDURE — G8420 CALC BMI NORM PARAMETERS: HCPCS | Performed by: SURGERY

## 2021-08-11 PROCEDURE — G8427 DOCREV CUR MEDS BY ELIG CLIN: HCPCS | Performed by: SURGERY

## 2021-08-11 ASSESSMENT — ENCOUNTER SYMPTOMS
ABDOMINAL PAIN: 0
SHORTNESS OF BREATH: 0
COUGH: 0

## 2021-08-11 NOTE — PROGRESS NOTES
(Site: Right Upper Arm, Position: Sitting, Cuff Size: Medium Adult)   Pulse 52   Temp 97.4 °F (36.3 °C)   Resp 18   Ht 5' 5\" (1.651 m)   Wt 133 lb 9.6 oz (60.6 kg)   SpO2 100%   BMI 22.23 kg/m²   General: Well-developed, well-nourished, in no apparent distress. Psychiatric: Alert and oriented x 3. Appropriate affect and behavior for today's visit. Musculoskeletal: Normal gait and range of motion in all 4 extremities. Lymphatic System:  No concerning cervical, supraclavicular or axillary lymphadenopathy. Breast Exam: Bilateral breast examination reveals ptotic breasts bilaterally. Dense fibrocystic changes bilaterally. Right Breast: Examination of the right breast in the upright and supine positions reveal no obvious masses, skin changes, dimpling, or retraction. The nipple and areola are without erosion, edema or ulceration. There is no obvious nipple discharge. There is no concerning axillary adenopathy. Left Breast: Examination of the left breast in the upright and supine positions reveal no obvious masses, skin changes, dimpling, or retraction. The nipple and areola are without erosion, edema or ulceration. There is no obvious nipple discharge. There is no concerning axillary adenopathy. IMAGING: I personally reviewed the breast imaging performed from 8/9/2021 which I ordered and discussed this with the patient. Stable breast MRI with no fibrocystic changes and no evidence of malignancy in either breast.  She was given a BI-RADS 2. Breast density is described as extremely dense breast tissue. RISK ASSESSMENT:  Formal risk assessment using the BERTHA Risk Assessment tool (Tyrer-Cuzick Model), version 8. Her 10 year risk of breast cancer is 4.8% (general population average 2.6%). Her lifetime risk for breast cancer is 20.5% (general population average 11.6%). Thus, she does meet high risk criteria (which is a lifetime risk of 20% or higher).     IMPRESSION/RECOMMENDATION:    Gil Amaro Kolby Rivera is a 53 y.o. woman who returns for follow up regarding her elevated risk for future breast cancer development. First of all, I reassured her that based upon her clinical examination today and her most recent imaging studies that there is no evidence of any underlying abnormalities that require further intervention or biopsy. We discussed our recommendations for increased surveillance, to include: annual screening mammography, annual screening MRI, and clinical breast exams every 6-12 months. We discussed the role of MRI scanning, its high sensitivity but also high false positive rate, and its importance as an adjunct in following patients at increased risk. In general, an MRI if it is performed, will be done on the alternate 6 months from the mammogram.  We also stressed the importance of breast awareness. Self breast evaluation was reviewed. We have previously discussed the role of chemoprophylaxis in women with an increased risk of breast cancer and she was referred to medical oncology in June 2020 by Dr. Jad Weinberg to discuss this further but decided not to make an appointment because she was no longer interested due to the potential side effects. At this time, she prefers careful surveillance. Therefore I would recommend alternating both mammography and MRI. She will be due for bilateral screening mammogram on 11/4/2021 to I have ordered this and will call her with the results. I would then plan to see her back 6 months after that for a clinical breast exam with a breast MRI prior to her appointment. In the interim, I encouraged her to continue her self examinations on a monthly basis and to alert me of any changes. I answered all of her questions thoroughly, and she does seem pleased with this plan of approach. I encouraged her to contact me in the interim if any new questions or concerns arise.      Summary:  - bilateral screening mammogram due on 11/4/2021  - Follow up in May 2022 with breast MRI prior to visit    Allan Solano MD

## 2021-11-08 ENCOUNTER — HOSPITAL ENCOUNTER (OUTPATIENT)
Dept: MAMMOGRAPHY | Age: 51
Discharge: HOME | End: 2021-11-08
Payer: COMMERCIAL

## 2021-11-08 VITALS — WEIGHT: 130 LBS | BODY MASS INDEX: 21.66 KG/M2 | HEIGHT: 65 IN

## 2021-11-08 DIAGNOSIS — Z12.31 ENCOUNTER FOR SCREENING MAMMOGRAM FOR BREAST CANCER: ICD-10-CM

## 2021-11-08 PROCEDURE — 77063 BREAST TOMOSYNTHESIS BI: CPT

## 2022-02-22 ENCOUNTER — TELEPHONE (OUTPATIENT)
Dept: SURGERY | Age: 52
End: 2022-02-22

## 2022-02-22 NOTE — TELEPHONE ENCOUNTER
 Called patient left message on voice mail to move appointment on 05/18/22 to 05/19/22, and see William Xie at 1:30.     Dr Panchito Mora has left the practice. If this time and date does not work for patient, please reschedule.

## 2022-04-18 ENCOUNTER — OFFICE VISIT (OUTPATIENT)
Dept: DERMATOLOGY | Age: 52
End: 2022-04-18
Payer: COMMERCIAL

## 2022-04-18 DIAGNOSIS — D22.9 BENIGN NEVUS: ICD-10-CM

## 2022-04-18 DIAGNOSIS — L72.0 EPIDERMAL CYST: ICD-10-CM

## 2022-04-18 DIAGNOSIS — L57.8 DIFFUSE PHOTODAMAGE OF SKIN: ICD-10-CM

## 2022-04-18 DIAGNOSIS — R22.9 SKIN NODULE: Primary | ICD-10-CM

## 2022-04-18 PROCEDURE — 3017F COLORECTAL CA SCREEN DOC REV: CPT | Performed by: DERMATOLOGY

## 2022-04-18 PROCEDURE — G8427 DOCREV CUR MEDS BY ELIG CLIN: HCPCS | Performed by: DERMATOLOGY

## 2022-04-18 PROCEDURE — 1036F TOBACCO NON-USER: CPT | Performed by: DERMATOLOGY

## 2022-04-18 PROCEDURE — 99214 OFFICE O/P EST MOD 30 MIN: CPT | Performed by: DERMATOLOGY

## 2022-04-18 PROCEDURE — G8420 CALC BMI NORM PARAMETERS: HCPCS | Performed by: DERMATOLOGY

## 2022-04-18 RX ORDER — TRETINOIN 0.5 MG/G
CREAM TOPICAL
Qty: 20 G | Refills: 4 | Status: SHIPPED | OUTPATIENT
Start: 2022-04-18

## 2022-04-18 NOTE — PROGRESS NOTES
[facility]) Dermatology  Myla Cordoba M.D.  [phone]       Kar Burgos  1970    49 y.o. female     Date of Visit: 4/18/2022    Chief Complaint:   Chief Complaint   Patient presents with    Skin Lesion        I was asked to see this patient by Dr. Rojo ref. provider found. History of Present Illness:  1. Total-body skin exam    New nodule subcutaneous left thigh-present for the last 4 to 6 weeks. Does not remember preceding trauma. Seems to be reducing in size. New nodule right axilla-has never been infected or drained. Asymptomatic    Multiple nevi. Stable in size, shape, color. Has not noticed any new or changing pigmented lesions. Photodamage-progressive freckling and lentigines of sun exposed areas. Does wear hats and sunscreen consistently. Has used tretinoin topically but intermittently. Would like to restart and be more consistent. No previous personal  history of skin cancer.  Wears hats and sunscreen.     Mother with a history of nonmelanoma skin cancer    Review of Systems:  Constitutional: Reports general sense of well-being       Past Medical History, Surgical History, Family History, Medications and Allergies reviewed.     Social History:   Social History     Socioeconomic History    Marital status:      Spouse name: Not on file    Number of children: Not on file    Years of education: Not on file    Highest education level: Not on file   Occupational History    Not on file   Tobacco Use    Smoking status: Never Smoker    Smokeless tobacco: Never Used   Vaping Use    Vaping Use: Never used   Substance and Sexual Activity    Alcohol use: Not on file     Comment: socially    Drug use: Never    Sexual activity: Not on file   Other Topics Concern    Not on file   Social History Narrative    Not on file     Social Determinants of Health     Financial Resource Strain:     Difficulty of Paying Living Expenses: Not on file   Food Insecurity:     Worried About Running Out of Food in the Last Year: Not on file    Ran Out of Food in the Last Year: Not on file   Transportation Needs:     Lack of Transportation (Medical): Not on file    Lack of Transportation (Non-Medical): Not on file   Physical Activity:     Days of Exercise per Week: Not on file    Minutes of Exercise per Session: Not on file   Stress:     Feeling of Stress : Not on file   Social Connections:     Frequency of Communication with Friends and Family: Not on file    Frequency of Social Gatherings with Friends and Family: Not on file    Attends Quaker Services: Not on file    Active Member of [address] [place] [address] [employer] or Organizations: Not on file    Attends Club or Organization Meetings: Not on file    Marital Status: Not on file   Intimate Partner Violence:     Fear of Current or Ex-Partner: Not on file    Emotionally Abused: Not on file    Physically Abused: Not on file    Sexually Abused: Not on file   Housing Stability:     Unable to Pay for Housing in the Last Year: Not on file    Number of [place] in the Last Year: Not on file    Unstable Housing in the Last Year: Not on file       Physical Examination       -General: Well-appearing, NAD  1. Normal affect. Total body skin exam including scalp, face, neck, chest, abdomen, back, bilateral upper extremities, bilateral lower extremities, ocular conjunctiva, external lips, and nails was performed. Examination normal unless stated below. Underwear area not examined. Scattered on the trunk and extremities are multiple well-defined round and oval symmetric smoothly-bordered uniformly brown macules and papules. Mobile firm nodule beneath varicosity left thigh-approximately 6 mm, firm, mobile  Right axilla mobile 4 mm nodule with dilated punctum  Diffuse background photodamage with freckling and lentigines    Assessment and Plan     1.  Skin nodule-differential diagnosis includes hematoma, dermatofibroma, cyst, less likely malignancy-discussed monitoring lesion as it seems to be reducing in size. If lesion does not resolve fully or increases in size, see primary care doctor for further evaluation-possible ultrasound versus surgical consult. 2. Epidermal cyst-monitor for change-if lesion increases in size, discussed removal   3. Benign nevus - Benign acquired melanocytic nevi  -Recommend monthly self skin exams   -Educated regarding the ABCDEs of melanoma detection   -Call for any new/changing moles or concerning lesions  -Reviewed sun protective behavior -- sun avoidance during the peak hours of the day, sun-protective clothing (including hat and sunglasses), sunscreen use (water resistant, broad spectrum, SPF at least 30, need for reapplication every 2 to 3 hours), avoidance of tanning beds      4. Diffuse photodamage of skin-progressive, not at treatment goal-tretinoin 0.05% cream nightly-reviewed side effects and contraindications. Avoid waxes or peels.   Discussed titrating use for irritation

## 2022-05-24 ENCOUNTER — HOSPITAL ENCOUNTER (OUTPATIENT)
Dept: MRI IMAGING | Age: 52
Discharge: HOME | End: 2022-05-24
Payer: COMMERCIAL

## 2022-05-24 DIAGNOSIS — R92.2 DENSE BREAST TISSUE: ICD-10-CM

## 2022-05-24 DIAGNOSIS — Z91.89 AT HIGH RISK FOR BREAST CANCER: ICD-10-CM

## 2022-05-24 PROCEDURE — 6360000004 HC RX CONTRAST MEDICATION: Performed by: SURGERY

## 2022-05-24 PROCEDURE — 77049 MRI BREAST C-+ W/CAD BI: CPT

## 2022-05-24 PROCEDURE — A9576 INJ PROHANCE MULTIPACK: HCPCS | Performed by: SURGERY

## 2022-05-24 RX ADMIN — GADOTERIDOL 13 ML: 279.3 INJECTION, SOLUTION INTRAVENOUS at 15:37

## 2022-06-09 ENCOUNTER — OFFICE VISIT (OUTPATIENT)
Dept: SURGERY | Age: 52
End: 2022-06-09
Payer: COMMERCIAL

## 2022-06-09 VITALS
DIASTOLIC BLOOD PRESSURE: 64 MMHG | HEART RATE: 62 BPM | SYSTOLIC BLOOD PRESSURE: 102 MMHG | RESPIRATION RATE: 18 BRPM | TEMPERATURE: 97.4 F | WEIGHT: 135.6 LBS | OXYGEN SATURATION: 100 % | HEIGHT: 65 IN | BODY MASS INDEX: 22.59 KG/M2

## 2022-06-09 DIAGNOSIS — Z91.89 AT HIGH RISK FOR BREAST CANCER: Primary | ICD-10-CM

## 2022-06-09 DIAGNOSIS — Z12.31 ENCOUNTER FOR SCREENING MAMMOGRAM FOR BREAST CANCER: Primary | ICD-10-CM

## 2022-06-09 DIAGNOSIS — Z98.890 HISTORY OF BENIGN BREAST BIOPSY: ICD-10-CM

## 2022-06-09 DIAGNOSIS — Z12.31 ENCOUNTER FOR SCREENING MAMMOGRAM FOR BREAST CANCER: ICD-10-CM

## 2022-06-09 PROCEDURE — 3017F COLORECTAL CA SCREEN DOC REV: CPT | Performed by: NURSE PRACTITIONER

## 2022-06-09 PROCEDURE — G8420 CALC BMI NORM PARAMETERS: HCPCS | Performed by: NURSE PRACTITIONER

## 2022-06-09 PROCEDURE — G8427 DOCREV CUR MEDS BY ELIG CLIN: HCPCS | Performed by: NURSE PRACTITIONER

## 2022-06-09 PROCEDURE — 99214 OFFICE O/P EST MOD 30 MIN: CPT | Performed by: NURSE PRACTITIONER

## 2022-06-09 PROCEDURE — 1036F TOBACCO NON-USER: CPT | Performed by: NURSE PRACTITIONER

## 2022-06-09 ASSESSMENT — ENCOUNTER SYMPTOMS
COUGH: 0
ABDOMINAL PAIN: 0
SHORTNESS OF BREATH: 0

## 2022-06-09 NOTE — PATIENT INSTRUCTIONS
Healthy Lifestyle Recommendations: healthy diet (decrease consumption of red meat, increase fresh fruits and vegetables), decreased alcohol consumption (less than 4 drinks/week), adequate sleep (goal 6-8 hours), routine exercise (goal 150 minutes/week or greater), weight control. Patient Education        Breast Self-Exam: Care Instructions  Your Care Instructions     A breast self-exam is when you check your breasts for lumps or changes. This regular exam helps you learn how your breasts normally look and feel. Mostbreast problems or changes are not because of cancer. Breast self-exam is not a substitute for a mammogram. Having regular breast exams by your doctor and regular mammograms improve your chances of finding anyproblems with your breasts. Some women set a time each month to do a step-by-step breast self-exam. Other women like a less formal system. They might look at their breasts as they brushtheir teeth, or feel their breasts once in a while in the shower. If you notice a change in your breast, tell your doctor. Follow-up care is a key part of your treatment and safety. Be sure to make and go to all appointments, and call your doctor if you are having problems. It's also a good idea to know your test results and keep alist of the medicines you take. How do you do a breast self-exam?   The best time to examine your breasts is usually one week after your menstrual period begins. Your breasts should not be tender then. If you do not have periods, you might do your exam on a day of the month that is easy to remember.  To examine your breasts:  ? Remove all your clothes above the waist and lie down. When you are lying down, your breast tissue spreads evenly over your chest wall, which makes it easier to feel all your breast tissue. ?  Use the pads--not the fingertips--of the 3 middle fingers of your left hand to check your right breast. Move your fingers slowly in small coin-sized circles that overlap. ? Use three levels of pressure to feel of all your breast tissue. Use light pressure to feel the tissue close to the skin surface. Use medium pressure to feel a little deeper. Use firm pressure to feel your tissue close to your breastbone and ribs. Use each pressure level to feel your breast tissue before moving on to the next spot. ? Check your entire breast, moving up and down as if following a strip from the collarbone to the bra line, and from the armpit to the ribs. Repeat until you have covered the entire breast.  ? Repeat this procedure for your left breast, using the pads of the 3 middle fingers of your right hand.  To examine your breasts while in the shower:  ? Place one arm over your head and lightly soap your breast on that side. ? Using the pads of your fingers, gently move your hand over your breast (in the strip pattern described above), feeling carefully for any lumps or changes. ? Repeat for the other breast.   Have your doctor inspect anything you notice to see if you need further testing. Where can you learn more? Go to [email or web address] and sign in to your [employer] account. Enter P148 in the [facility] box to learn more about \"Breast Self-Exam: Care Instructions. \"     If you do not have an account, please click on the \"Sign Up Now\" link. Current as of: September 8, 2021               Content Version: 13.2  © 2006-2022 Healthwise, Incorporated. Care instructions adapted under license by [facility] ([facility]). If you have questions about a medical condition or this instruction, always ask your healthcare professional. [place] [address] any warranty or liability for your use of this information.

## 2022-06-09 NOTE — PROGRESS NOTES
Genetic testing: none     Family history significant for breast and ovarian cancer: none     History reviewed. No pertinent family history. Allergies as of 06/09/2022    (No Known Allergies)       Social History     Tobacco Use    Smoking status: Never Smoker    Smokeless tobacco: Never Used   Vaping Use    Vaping Use: Never used   Substance Use Topics    Alcohol use: Yes     Comment: socially    Drug use: Never         Current Outpatient Medications:     tretinoin (RETIN-A) 0.05 % cream, Apply a pea sized amount to the face QHS, Disp: 20 g, Rfl: 4    clobetasol (TEMOVATE) 0.05 % external solution, Apply to affected area BID PRN flares, Disp: 50 mL, Rfl: 2    levothyroxine (SYNTHROID) 125 MCG tablet, Take 125 mcg by mouth daily, Disp: , Rfl:       Medications: documentation has been reviewed in the electronic medical record and patient office intake form. REVIEW OF SYSTEMS:  Constitutional: Negative for fever  HENT: Negative for sore throat  Eyes: Negative for redness   Respiratory: Negative for dyspnea, cough  Cardiovascular: Negative for chest pain  Gastrointestinal: Negative for vomiting, diarrhea   Genitourinary: Negative for hematuria   Musculoskeletal: Negative for arthralgias   Skin: Negative for rash  Neurological: Negative for syncope  Hematological: Negative for adenopathy  Psychiatric/Behavorial: Negative for anxiety    PHYSICAL EXAM:  /64   Pulse 62   Temp 97.4 °F (36.3 °C)   Resp 18   Ht 5' 5\" (1.651 m)   Wt 135 lb 9.6 oz (61.5 kg)   SpO2 100%   BMI 22.57 kg/m²   Constitutional: She appearswell-nourished. No apparent distress. Breast: The patient was examined in the upright and supine position. Breasts are symmetrically ptotic. Right: No new masses or changes in breast contour. No skin changes of the breast or nipple areolar complex. No nipple inversion or discharge. No erythema, thickening (peau d'orange), or dimpling.         Left: No new masses or changes in breast contour. No skin changes of the breast or nipple areolar complex. No nipple inversion or discharge. No erythema, thickening (peau d'orange), or dimpling. There is no axillary lymphadenopathy palpated bilaterally. Neck: Neck supple. No tracheal deviation present. No obvious mass. Lymphatics: No palpable supraclavicular, cervical, or axillary lymphadenopathy  Skin: No rash noted. No erythema. Neurologic: alert and oriented. Extremities: appear well perfused. Risk assessment using BERTHA Breast Cancer Risk Evaluation Tool to evaluate her risk compared to the general population. Her lifetime risk for breast cancer is 14.1% (general population average 11.4%). ASSESSMENT:  - Average Risk for Breast Cancer based on risk profile for breast cancer screening. Bryan-Irena (BERTHA) 8.0 Model re-calculated today at 14.1% risk today (previously 20.5%), anything greater than 20% is considered to be high risk for breast cancer.    - Screening Breast Examination - no concerning findings on clinical exam today. - History right core needle breast biopsy in 2013 which demonstrated fibrocystic changes with adenosis and microcalcifications. Leo Evy was no evidence of atypia or malignancy. PLAN:   · Musa Church is at average/slightly increased risk for breast cancer however no longer considered at high risk therefore recommend continued annual screening mammography with tomosynthesis. She does not meet criteria for high risk MRI breast screening per NCCN guidelines    Continue annual clinical breast exams    Discussed the importance of breast awareness including the importance and technique of self breast exams   Most recent imaging was reviewed and the results were discussed with the patient, all questions answered   Education provided for Healthy Lifestyle Recommendations: healthy diet, routine exercise, weight control, decreased alcohol consumption.    Follow up in August after annual screening mammography. KATIA Hooper-CNP  [facility])   Surgical Breast Oncology   [phone]    All of the patient's questions were answered at this time however, she was encouraged to call the office with any further inquiries. Approximately 30 minutes of time were spent in preparation, direct patient contact, counseling, care coordination, documentation and activities otherwise related to this encounter.

## 2022-11-14 ENCOUNTER — OFFICE VISIT (OUTPATIENT)
Dept: SURGERY | Age: 52
End: 2022-11-14
Payer: COMMERCIAL

## 2022-11-14 ENCOUNTER — HOSPITAL ENCOUNTER (OUTPATIENT)
Dept: MAMMOGRAPHY | Age: 52
Discharge: HOME | End: 2022-11-14
Payer: COMMERCIAL

## 2022-11-14 VITALS
HEIGHT: 65 IN | TEMPERATURE: 98 F | RESPIRATION RATE: 18 BRPM | BODY MASS INDEX: 23.19 KG/M2 | OXYGEN SATURATION: 99 % | WEIGHT: 139.2 LBS | HEART RATE: 57 BPM

## 2022-11-14 VITALS — BODY MASS INDEX: 22.49 KG/M2 | HEIGHT: 65 IN | WEIGHT: 135 LBS

## 2022-11-14 DIAGNOSIS — Z12.31 ENCOUNTER FOR SCREENING MAMMOGRAM FOR BREAST CANCER: Primary | ICD-10-CM

## 2022-11-14 DIAGNOSIS — Z12.39 ENCOUNTER FOR SCREENING BREAST EXAMINATION: ICD-10-CM

## 2022-11-14 DIAGNOSIS — Z98.890 HISTORY OF BENIGN BREAST BIOPSY: ICD-10-CM

## 2022-11-14 DIAGNOSIS — Z12.31 ENCOUNTER FOR SCREENING MAMMOGRAM FOR BREAST CANCER: ICD-10-CM

## 2022-11-14 DIAGNOSIS — R92.2 DENSE BREAST TISSUE: ICD-10-CM

## 2022-11-14 PROCEDURE — 3017F COLORECTAL CA SCREEN DOC REV: CPT | Performed by: NURSE PRACTITIONER

## 2022-11-14 PROCEDURE — G8427 DOCREV CUR MEDS BY ELIG CLIN: HCPCS | Performed by: NURSE PRACTITIONER

## 2022-11-14 PROCEDURE — 1036F TOBACCO NON-USER: CPT | Performed by: NURSE PRACTITIONER

## 2022-11-14 PROCEDURE — 99213 OFFICE O/P EST LOW 20 MIN: CPT | Performed by: NURSE PRACTITIONER

## 2022-11-14 PROCEDURE — G8420 CALC BMI NORM PARAMETERS: HCPCS | Performed by: NURSE PRACTITIONER

## 2022-11-14 PROCEDURE — G8484 FLU IMMUNIZE NO ADMIN: HCPCS | Performed by: NURSE PRACTITIONER

## 2022-11-14 PROCEDURE — 77063 BREAST TOMOSYNTHESIS BI: CPT

## 2022-11-14 NOTE — PROGRESS NOTES
[facility])   Surgical Breast Oncology     Primary Care Provider: India Heath MD    CC: Annual breast exam and mammogram results     HPI:  Fermin Laws is a 47 y.o. woman here for routine follow up for annual breast exam and mammogram results. Overall doing well and has no breast related concerns or changes in her health. She states that she does perform routine self breast evaluations and has not noticed any new abnormalities such as masses, skin changes, color changes,nipple discharge, or changes to the nipple-areolar complex. She has a history of right core needle breast biopsy in 2013 which demonstrated fibrocystic changes with adenosis and microcalcifications. Diet: eats healthy to nourish and fuel body   Alcohol: occassional  Exercise: routine, bare classes, walks dog, nature hikes   Sleep: 6-8h/night   [employer]   treatment for CLL  17yo son     INTERVAL HISTORY:  Bilateral screening mammogram 11/8/2021:  Biopsy marker in the left breast.  Scattered benign-appearing calcifications in both breast.  No new concerning findings suggest malignancy. BI-RADS 2. Bilateral breast MRI 5/24/2022: In the upper outer right breast there is a biopsy site marker. Scattered foci of enhancement stable and not significantly changed from prior exams, consistent with background enhancement in the right and left breast.  No new concerning findings suggest malignancy. BI-RADS 2. Bilateral screening mammogram 11/14/2022: Innumerable microcalcifications bilateral breast, unchanged from prior studies. Localizing clip in the right breast from prior benign biopsy. No new concerning findings suggest malignancy. BI-RADS 2.      Past Medical History:   Diagnosis Date    Hypothyroidism        Past Surgical History:   Procedure Laterality Date    COLONOSCOPY N/A 9/9/2020    COLONOSCOPY WITH BIOPSY performed by Maria Luisa Francis MD at [facility]         Menarche age 16.   [id number]  Age first live birth 29  Breastfeeding Yes  Premenopausal, ovaries and uterus intact  Oral contraceptives Yes for 8 years  Hormone replacement No     Breast density: heterogeneously dense  Ashkenazi [employer]: no   Genetic testing: none     Family history significant for breast and ovarian cancer: none     History reviewed. No pertinent family history. Allergies as of 2022    (No Known Allergies)       Social History     Tobacco Use    Smoking status: Never    Smokeless tobacco: Never   Vaping Use    Vaping Use: Never used   Substance Use Topics    Alcohol use: Yes     Comment: socially    Drug use: Never         Current Outpatient Medications:     tretinoin (RETIN-A) 0.05 % cream, Apply a pea sized amount to the face QHS, Disp: 20 g, Rfl: 4    clobetasol (TEMOVATE) 0.05 % external solution, Apply to affected area BID PRN flares, Disp: 50 mL, Rfl: 2    levothyroxine (SYNTHROID) 125 MCG tablet, Take 125 mcg by mouth daily, Disp: , Rfl:       Medications: documentation has been reviewed in the electronic medical record and patient office intake form. REVIEW OF SYSTEMS:  Constitutional: Negative for fever  HENT: Negative for sore throat  Eyes: Negative for redness   Respiratory: Negative for dyspnea, cough  Cardiovascular: Negative for chest pain  Gastrointestinal: Negative for vomiting, diarrhea   Genitourinary: Negative for hematuria   Musculoskeletal: Negative for arthralgias   Skin: Negative for rash  Neurological: Negative for syncope  Hematological: Negative for adenopathy  Psychiatric/Behavorial: Negative for anxiety    PHYSICAL EXAM:  Pulse 57   Temp 98 °F (36.7 °C)   Resp 18   Ht 5' 5\" (1.651 m)   Wt 139 lb 3.2 oz (63.1 kg)   SpO2 99%   BMI 23.16 kg/m²   Constitutional: She appearswell-nourished. No apparent distress. Breast: The patient was examined in the upright and supine position. Breasts are symmetrically ptotic.         Right: No new masses or changes in breast contour. No skin changes of the breast or nipple areolar complex. No nipple inversion or discharge. No erythema, thickening (peau d'orange), or dimpling. Left: No new masses or changes in breast contour. No skin changes of the breast or nipple areolar complex. No nipple inversion or discharge. No erythema, thickening (peau d'orange), or dimpling. There is no axillary lymphadenopathy palpated bilaterally. Neck: Neck supple. No tracheal deviation present. No obvious mass. Lymphatics: No palpable supraclavicular, cervical, or axillary lymphadenopathy  Skin: No rash noted. No erythema. Neurologic: alert and oriented. Extremities: appear well perfused. Risk assessment using BERTHA Breast Cancer Risk Evaluation Tool to evaluate her risk compared to the general population. Her lifetime risk for breast cancer is 14.1% (general population average 11.4%). ASSESSMENT:  - Screening Breast Examination - no concerning findings on clinical breast exam.  No concerning finding suggestive of malignancy on breast imaging.   - History right core needle breast biopsy in 2013 which demonstrated fibrocystic changes with adenosis and microcalcifications. There was no evidence of atypia or malignancy. PLAN:   Recommend annual screening mammogram with tomosynthesis   Continue annual clinical breast exams   Discussed the importance of breast awareness including the importance and technique of self breast exams  Most recent imaging was reviewed and the results were discussed with the patient, all questions answered  Education provided for Healthy Lifestyle Recommendations: healthy diet, routine exercise, weight control, decreased alcohol consumption. Follow up after mammogram       KATIA Carrillo-CNP  [facility] [place] [facility])   Surgical Breast Oncology   [phone]    All of the patient's questions were answered at this time however, she was encouraged to call the office with any further inquiries. Approximately 20 minutes of time were spent in preparation, direct patient contact, counseling, care coordination, documentation and activities otherwise related to this encounter.

## 2022-12-13 ENCOUNTER — OFFICE VISIT (OUTPATIENT)
Dept: DERMATOLOGY | Age: 52
End: 2022-12-13
Payer: COMMERCIAL

## 2022-12-13 DIAGNOSIS — L03.111 CELLULITIS OF RIGHT AXILLA: ICD-10-CM

## 2022-12-13 DIAGNOSIS — L72.0 EPIDERMAL CYST: Primary | ICD-10-CM

## 2022-12-13 PROCEDURE — 1036F TOBACCO NON-USER: CPT | Performed by: DERMATOLOGY

## 2022-12-13 PROCEDURE — G8427 DOCREV CUR MEDS BY ELIG CLIN: HCPCS | Performed by: DERMATOLOGY

## 2022-12-13 PROCEDURE — G8484 FLU IMMUNIZE NO ADMIN: HCPCS | Performed by: DERMATOLOGY

## 2022-12-13 PROCEDURE — G8420 CALC BMI NORM PARAMETERS: HCPCS | Performed by: DERMATOLOGY

## 2022-12-13 PROCEDURE — 99213 OFFICE O/P EST LOW 20 MIN: CPT | Performed by: DERMATOLOGY

## 2022-12-13 PROCEDURE — 10060 I&D ABSCESS SIMPLE/SINGLE: CPT | Performed by: DERMATOLOGY

## 2022-12-13 PROCEDURE — 3017F COLORECTAL CA SCREEN DOC REV: CPT | Performed by: DERMATOLOGY

## 2022-12-13 RX ORDER — DOXYCYCLINE HYCLATE 100 MG/1
CAPSULE ORAL
Qty: 14 CAPSULE | Refills: 0 | Status: SHIPPED | OUTPATIENT
Start: 2022-12-13

## 2022-12-13 NOTE — PROGRESS NOTES
[facility]) Dermatology  Guille Mejia M.D.  [phone]       [facility]  1970    47 y.o. female     Date of Visit: 12/13/2022    Chief Complaint:   Chief Complaint   Patient presents with    Cyst     Ingrown hair R arm pit        I was asked to see this patient by Dr. Rojo ref. provider found. History of Present Illness:  1. Secondarily infected epidermal cyst right axilla. Cyst had been present for quite some time-in the last 2 weeks painful, erythematous. Has not drained. Not previously treated. No previous personal  history of skin cancer. Wears hats and sunscreen. Mother with a history of nonmelanoma skin cancer    Review of Systems:  Constitutional: Reports general sense of well-being       Past Medical History, Surgical History, Family History, Medications and Allergies reviewed. Social History:   Social History     Socioeconomic History    Marital status:      Spouse name: Not on file    Number of children: Not on file    Years of education: Not on file    Highest education level: Not on file   Occupational History    Not on file   Tobacco Use    Smoking status: Never    Smokeless tobacco: Never   Vaping Use    Vaping Use: Never used   Substance and Sexual Activity    Alcohol use: Yes     Comment: socially    Drug use: Never    Sexual activity: Not on file   Other Topics Concern    Not on file   Social History Narrative    Not on file     Social Determinants of Health     Financial Resource Strain: Not on file   Food Insecurity: Not on file   Transportation Needs: Not on file   Physical Activity: Not on file   Stress: Not on file   Social Connections: Not on file   Intimate Partner Violence: Not on file   Housing Stability: Not on file       Physical Examination       -General: Well-appearing, NAD  1.  1.2 cm firm nodule with punctum right axilla-erythematous      Assessment and Plan     1.  Epidermal cyst, secondarily infected-after the risk, complications, alternatives were explained to the patient informed consent was obtained. Lesion was anesthetized with 1% lidocaine with epinephrine and incision was made. Purulent drainage was removed. Wound care instructions were given to the patient. 2. Cellulitis of right axilla-start doxycycline 100 mg p.o. twice daily for 7 days-reviewed side effects and contraindications.   Recheck 1 month sooner if needed

## 2022-12-15 LAB
GRAM STAIN RESULT: ABNORMAL
WOUND/ABSCESS: ABNORMAL

## 2023-01-30 ENCOUNTER — OFFICE VISIT (OUTPATIENT)
Dept: DERMATOLOGY | Age: 53
End: 2023-01-30
Payer: COMMERCIAL

## 2023-01-30 DIAGNOSIS — L72.0 EPIDERMAL CYST: Primary | ICD-10-CM

## 2023-01-30 DIAGNOSIS — L85.3 XEROSIS CUTIS: ICD-10-CM

## 2023-01-30 DIAGNOSIS — L57.8 DIFFUSE PHOTODAMAGE OF SKIN: ICD-10-CM

## 2023-01-30 PROCEDURE — G8484 FLU IMMUNIZE NO ADMIN: HCPCS | Performed by: DERMATOLOGY

## 2023-01-30 PROCEDURE — G8420 CALC BMI NORM PARAMETERS: HCPCS | Performed by: DERMATOLOGY

## 2023-01-30 PROCEDURE — G8427 DOCREV CUR MEDS BY ELIG CLIN: HCPCS | Performed by: DERMATOLOGY

## 2023-01-30 PROCEDURE — 3017F COLORECTAL CA SCREEN DOC REV: CPT | Performed by: DERMATOLOGY

## 2023-01-30 PROCEDURE — 99213 OFFICE O/P EST LOW 20 MIN: CPT | Performed by: DERMATOLOGY

## 2023-01-30 PROCEDURE — 1036F TOBACCO NON-USER: CPT | Performed by: DERMATOLOGY

## 2023-01-30 NOTE — PROGRESS NOTES
[facility]) Dermatology  Usha Hawkins M.D.  [phone]       José Werner  1970    47 y.o. female     Date of Visit: 1/30/2023    Chief Complaint:   Chief Complaint   Patient presents with    Skin Lesion        I was asked to see this patient by Dr. Rojo ref. provider found. History of Present Illness:  1. Presents today for follow-up-epidermal cyst right axilla treated with incision and drainage and doxycycline about 6 weeks ago. Excellent improvement. No longer bothersome. Minimal scar, tolerated doxycycline without difficulty. Note xerosis of her skin-using CeraVe cream regularly, trying to drink extra water. Also notes photodamage-background freckling and lentigines of sun exposed areas. No previous personal  history of skin cancer. Wears hats and sunscreen. Mother with a history of nonmelanoma skin cancer    Review of Systems:  Constitutional: Reports general sense of well-being       Past Medical History, Surgical History, Family History, Medications and Allergies reviewed.     Social History:   Social History     Socioeconomic History    Marital status:      Spouse name: Not on file    Number of children: Not on file    Years of education: Not on file    Highest education level: Not on file   Occupational History    Not on file   Tobacco Use    Smoking status: Never    Smokeless tobacco: Never   Vaping Use    Vaping Use: Never used   Substance and Sexual Activity    Alcohol use: Yes     Comment: socially    Drug use: Never    Sexual activity: Not on file   Other Topics Concern    Not on file   Social History Narrative    Not on file     Social Determinants of Health     Financial Resource Strain: Not on file   Food Insecurity: Not on file   Transportation Needs: Not on file   Physical Activity: Not on file   Stress: Not on file   Social Connections: Not on file   Intimate Partner Violence: Not on file   Housing Stability: Not on file       Physical Examination -General: Well-appearing, NAD  1. Well-healed 4 mm scar right axilla. 3 mm subtle residual nodule versus scar. Right forearm with background freckling and lentigines, xerosis      Assessment and Plan     1. Epidermal cyst-scar versus cyst, no further therapy. Monitor for resolution versus growth   2. Xerosis cutis-continue CeraVe, increase application. Gentle soaps. 3. Diffuse photodamage of skin - Discussed changes in skin from chronic UV exposure and ways to mitigate further damage- hats when outside, SPF 50 or higher that is reapplied regularly, daily SPF application, UV protective clothing, and sun avoidance.

## 2023-11-20 ENCOUNTER — OFFICE VISIT (OUTPATIENT)
Dept: SURGERY | Age: 53
End: 2023-11-20
Payer: COMMERCIAL

## 2023-11-20 ENCOUNTER — HOSPITAL ENCOUNTER (OUTPATIENT)
Dept: MAMMOGRAPHY | Age: 53
Discharge: HOME | End: 2023-11-20
Payer: COMMERCIAL

## 2023-11-20 VITALS — HEIGHT: 65 IN | WEIGHT: 131.6 LBS | BODY MASS INDEX: 21.92 KG/M2

## 2023-11-20 VITALS — BODY MASS INDEX: 21.66 KG/M2 | HEIGHT: 65 IN | WEIGHT: 130 LBS

## 2023-11-20 DIAGNOSIS — Z12.31 ENCOUNTER FOR SCREENING MAMMOGRAM FOR BREAST CANCER: Primary | ICD-10-CM

## 2023-11-20 DIAGNOSIS — Z98.890 HISTORY OF BENIGN BREAST BIOPSY: ICD-10-CM

## 2023-11-20 DIAGNOSIS — Z12.31 ENCOUNTER FOR SCREENING MAMMOGRAM FOR BREAST CANCER: ICD-10-CM

## 2023-11-20 DIAGNOSIS — Z12.39 ENCOUNTER FOR SCREENING BREAST EXAMINATION: ICD-10-CM

## 2023-11-20 DIAGNOSIS — R92.2 DENSE BREAST TISSUE: ICD-10-CM

## 2023-11-20 PROCEDURE — 3017F COLORECTAL CA SCREEN DOC REV: CPT | Performed by: NURSE PRACTITIONER

## 2023-11-20 PROCEDURE — G8427 DOCREV CUR MEDS BY ELIG CLIN: HCPCS | Performed by: NURSE PRACTITIONER

## 2023-11-20 PROCEDURE — 99213 OFFICE O/P EST LOW 20 MIN: CPT | Performed by: NURSE PRACTITIONER

## 2023-11-20 PROCEDURE — G8420 CALC BMI NORM PARAMETERS: HCPCS | Performed by: NURSE PRACTITIONER

## 2023-11-20 PROCEDURE — G8484 FLU IMMUNIZE NO ADMIN: HCPCS | Performed by: NURSE PRACTITIONER

## 2023-11-20 PROCEDURE — 77063 BREAST TOMOSYNTHESIS BI: CPT

## 2023-11-20 PROCEDURE — 1036F TOBACCO NON-USER: CPT | Performed by: NURSE PRACTITIONER

## 2023-11-20 NOTE — PROGRESS NOTES
Lifestyle Recommendations: healthy diet, routine exercise, weight control, decreased alcohol consumption. Follow up after mammogram       KATIA Dela Cruz-CNP  [facility])   Surgical Breast Oncology   [phone]    All of the patient's questions were answered at this time however, she was encouraged to call the office with any further inquiries. Approximately 20 minutes of time were spent in preparation, direct patient contact, counseling, care coordination, documentation and activities otherwise related to this encounter.

## 2023-11-21 DIAGNOSIS — Z12.31 ENCOUNTER FOR SCREENING MAMMOGRAM FOR BREAST CANCER: Primary | ICD-10-CM

## 2023-11-21 DIAGNOSIS — Z91.89 AT HIGH RISK FOR BREAST CANCER: ICD-10-CM

## 2024-02-22 NOTE — TELEPHONE ENCOUNTER
Pt calling wanting a refill on medication Tretinoin Retin-A 0.05% cream pls send to [facility]  [facility] in \[facility] [place] [facility]\"" any questions pls return pt call back @ [phone]

## 2024-02-24 RX ORDER — TRETINOIN 0.5 MG/G
CREAM TOPICAL
Qty: 20 G | Refills: 4 | Status: SHIPPED | OUTPATIENT
Start: 2024-02-24

## 2024-02-26 ENCOUNTER — TELEPHONE (OUTPATIENT)
Dept: DERMATOLOGY | Age: 54
End: 2024-02-26

## 2024-02-27 NOTE — TELEPHONE ENCOUNTER
Submitted PA for Tretinoin  [facility] Key: [id number] STATUS: PENDING.    Follow up done daily; if no decision with in three days we will refax.  If another three days goes by with no decision will call the insurance for status.

## 2024-02-28 NOTE — TELEPHONE ENCOUNTER
Received fax stating PA needs to be submitted to [facility]. PA refaxed today 2/28/2024

## 2024-04-09 ENCOUNTER — OFFICE VISIT (OUTPATIENT)
Dept: DERMATOLOGY | Age: 54
End: 2024-04-09
Payer: COMMERCIAL

## 2024-04-09 DIAGNOSIS — L85.3 XEROSIS CUTIS: ICD-10-CM

## 2024-04-09 DIAGNOSIS — L57.8 DIFFUSE PHOTODAMAGE OF SKIN: ICD-10-CM

## 2024-04-09 DIAGNOSIS — D22.9 BENIGN NEVUS: Primary | ICD-10-CM

## 2024-04-09 PROCEDURE — 3017F COLORECTAL CA SCREEN DOC REV: CPT | Performed by: DERMATOLOGY

## 2024-04-09 PROCEDURE — G8427 DOCREV CUR MEDS BY ELIG CLIN: HCPCS | Performed by: DERMATOLOGY

## 2024-04-09 PROCEDURE — 1036F TOBACCO NON-USER: CPT | Performed by: DERMATOLOGY

## 2024-04-09 PROCEDURE — G8420 CALC BMI NORM PARAMETERS: HCPCS | Performed by: DERMATOLOGY

## 2024-04-09 PROCEDURE — 99213 OFFICE O/P EST LOW 20 MIN: CPT | Performed by: DERMATOLOGY

## 2024-04-09 NOTE — PROGRESS NOTES
[facility] Dermatology  Kelly Meraz M.D.  [phone]       Ifrah Nayak  1970    53 y.o. female     Date of Visit: 4/9/2024    Chief Complaint:   Chief Complaint   Patient presents with    Skin Lesion        I was asked to see this patient by Dr. Rojo ref. provider found.    History of Present Illness:  1. TBSE    Multiple nevi. Patient has not noticed any new or changing pigmented lesions.   Stable in size, shape, color. Not itching, bleeding.     Photodamage.  Progressive freckling and lentigines of sun exposed areas.  Patient is wearing hats and sunscreen consistently.     Xerosis-applies CeraVe, coconut oil.  Still dry.    4/24 1 son graduating from college this year-Will attend [employer]     No previous personal  history of skin cancer.  Wears hats and sunscreen.     Mother with a history of nonmelanoma skin cancer    12/22 epidermal cyst right axilla incision and drainage, doxycycline    Review of Systems:  Constitutional: Reports general sense of well-being       Past Medical History, Surgical History, Family History, Medications and Allergies reviewed.    Social History:   Social History     Socioeconomic History    Marital status:      Spouse name: Not on file    Number of children: Not on file    Years of education: Not on file    Highest education level: Not on file   Occupational History    Not on file   Tobacco Use    Smoking status: Never    Smokeless tobacco: Never   Vaping Use    Vaping Use: Never used   Substance and Sexual Activity    Alcohol use: Yes     Comment: socially    Drug use: Never    Sexual activity: Not on file   Other Topics Concern    Not on file   Social History Narrative    Not on file     Social Determinants of Health     Financial Resource Strain: Not on file   Food Insecurity: Not on file   Transportation Needs: Not on file   Physical Activity: Not on file   Stress: Not on file   Social Connections: Not on file   Intimate Partner Violence: Not on file   Housing

## 2024-04-26 ENCOUNTER — TELEPHONE (OUTPATIENT)
Dept: SURGERY | Age: 54
End: 2024-04-26

## 2024-04-26 NOTE — TELEPHONE ENCOUNTER
Spoke to patient Right breast, non painful, feels a lump, just noticed 2 weeks ago, has not increased in size during that time, denies redness or drainage. Patient is very anxious.    Order placed for bilateral diagnostic mammogram.  Please schedule imaging and coordinate next available office visit with Abilio

## 2024-04-26 NOTE — TELEPHONE ENCOUNTER
Patient called in wanting to be seen due to finding a new lump on her right breast. Patient feels that something doesn't feel right or is off.     Patient can be reached @[phone].     Willing to travel to any location.

## 2024-04-29 NOTE — TELEPHONE ENCOUNTER
Left message to get patient scheduled for imaging and ov with Tanya. Looking @10:\"30 on 05/17/24 at [facility] for appt. Imaging first available in . Call can be transferred to me.

## 2024-05-03 ENCOUNTER — HOSPITAL ENCOUNTER (OUTPATIENT)
Dept: ULTRASOUND IMAGING | Age: 54
Discharge: HOME | End: 2024-05-03
Payer: COMMERCIAL

## 2024-05-03 ENCOUNTER — HOSPITAL ENCOUNTER (OUTPATIENT)
Dept: MAMMOGRAPHY | Age: 54
Discharge: HOME | End: 2024-05-03
Payer: COMMERCIAL

## 2024-05-03 VITALS — WEIGHT: 131 LBS | HEIGHT: 65 IN | BODY MASS INDEX: 21.83 KG/M2

## 2024-05-03 DIAGNOSIS — N63.10 MASS OF RIGHT BREAST, UNSPECIFIED QUADRANT: ICD-10-CM

## 2024-05-03 DIAGNOSIS — N63.0 LUMP IN FEMALE BREAST: ICD-10-CM

## 2024-05-03 PROCEDURE — G0279 TOMOSYNTHESIS, MAMMO: HCPCS

## 2024-05-03 PROCEDURE — 76642 ULTRASOUND BREAST LIMITED: CPT

## 2024-05-07 ENCOUNTER — TELEPHONE (OUTPATIENT)
Dept: SURGERY | Age: 54
End: 2024-05-07

## 2024-05-07 NOTE — TELEPHONE ENCOUNTER
----- Message from KATIA Graff CNP sent at 5/7/2024  9:11 AM EDT -----  Please let patient know, mammogram and U/S looks good, no concerning findings in area of concern for new lump right breast.  No new suspicious or concerning findings suggestive of malignancy.

## 2024-05-07 NOTE — TELEPHONE ENCOUNTER
Please note there is more than one telephone encounter for this issue.    Patient had additional imaging and results were called (noted in another telephone encounter). Radiology recommendations were to have clinical follow up for new breast lump.    Please call to offer patient Thursday 5/9/24 at 8:30 am in the [place] office. If this date/time/location does not work for the patient, she can be added to the next available with any provider. She can also be added to the wait list if needed.

## 2024-05-08 NOTE — TELEPHONE ENCOUNTER
Spoke with patient and she wants to wait and see if things change or the lump feels different and then she will call to be seen.

## 2024-06-04 NOTE — PATIENT INSTRUCTIONS
Healthy Lifestyle Recommendations: healthy diet (decrease consumption of red meat, increase fresh fruits and vegetables), decreased alcohol consumption (less than 4 drinks/week), adequate sleep (goal 6-8 hours), routine exercise (goal 150 minutes/week or greater), weight control. Patient Education        Breast Self-Exam: Care Instructions  Your Care Instructions     A breast self-exam is when you check your breasts for lumps or changes. This regular exam helps you learn how your breasts normally look and feel. Most breast problems or changes are not because of cancer. Breast self-exam is not a substitute for a mammogram. Having regular breast exams by your doctor and regular mammograms improve your chances of finding any problems with your breasts. Some women set a time each month to do a step-by-step breast self-exam. Other women like a less formal system. They might look at their breasts as they brush their teeth, or feel their breasts once in a while in the shower. If you notice a change in your breast, tell your doctor. Follow-up care is a key part of your treatment and safety. Be sure to make and go to all appointments, and call your doctor if you are having problems. It's also a good idea to know your test results and keep a list of the medicines you take. How do you do a breast self-exam?  The best time to examine your breasts is usually one week after your menstrual period begins. Your breasts should not be tender then. If you do not have periods, you might do your exam on a day of the month that is easy to remember. To examine your breasts:  Remove all your clothes above the waist and lie down. When you are lying down, your breast tissue spreads evenly over your chest wall, which makes it easier to feel all your breast tissue. Use the pads--not the fingertips--of the 3 middle fingers of your left hand to check your right breast. Move your fingers slowly in small coin-sized circles that overlap.   Use three levels of pressure to feel of all your breast tissue. Use light pressure to feel the tissue close to the skin surface. Use medium pressure to feel a little deeper. Use firm pressure to feel your tissue close to your breastbone and ribs. Use each pressure level to feel your breast tissue before moving on to the next spot. Check your entire breast, moving up and down as if following a strip from the collarbone to the bra line, and from the armpit to the ribs. Repeat until you have covered the entire breast.  Repeat this procedure for your left breast, using the pads of the 3 middle fingers of your right hand. To examine your breasts while in the shower:  Place one arm over your head and lightly soap your breast on that side. Using the pads of your fingers, gently move your hand over your breast (in the strip pattern described above), feeling carefully for any lumps or changes. Repeat for the other breast.  Have your doctor inspect anything you notice to see if you need further testing. Where can you learn more? Go to [email or web address] org and sign in to your [employer] account. Enter P148 in the [facility] box to learn more about \"Breast Self-Exam: Care Instructions. \"     If you do not have an account, please click on the \"Sign Up Now\" link. Current as of: November 22, 2021               Content Version: 13.4  © 0696-4412 [employer]wise, Incorporated. Care instructions adapted under license by [facility] [address] If you have questions about a medical condition or this instruction, always ask your healthcare professional. [place] [address] any warranty or liability for your use of this information. Yes

## 2024-11-25 ENCOUNTER — OFFICE VISIT (OUTPATIENT)
Dept: SURGERY | Age: 54
End: 2024-11-25
Payer: COMMERCIAL

## 2024-11-25 ENCOUNTER — HOSPITAL ENCOUNTER (OUTPATIENT)
Dept: MAMMOGRAPHY | Age: 54
Discharge: HOME | End: 2024-11-25
Payer: COMMERCIAL

## 2024-11-25 VITALS
DIASTOLIC BLOOD PRESSURE: 81 MMHG | SYSTOLIC BLOOD PRESSURE: 127 MMHG | WEIGHT: 129 LBS | BODY MASS INDEX: 21.49 KG/M2 | HEIGHT: 65 IN | HEART RATE: 52 BPM

## 2024-11-25 DIAGNOSIS — Z91.89 AT HIGH RISK FOR BREAST CANCER: ICD-10-CM

## 2024-11-25 DIAGNOSIS — Z12.31 ENCOUNTER FOR SCREENING MAMMOGRAM FOR BREAST CANCER: ICD-10-CM

## 2024-11-25 DIAGNOSIS — Z12.39 ENCOUNTER FOR SCREENING BREAST EXAMINATION: ICD-10-CM

## 2024-11-25 DIAGNOSIS — Z12.31 ENCOUNTER FOR SCREENING MAMMOGRAM FOR BREAST CANCER: Primary | ICD-10-CM

## 2024-11-25 DIAGNOSIS — R92.30 DENSE BREAST TISSUE: ICD-10-CM

## 2024-11-25 DIAGNOSIS — Z98.890 HISTORY OF BENIGN BREAST BIOPSY: Primary | ICD-10-CM

## 2024-11-25 PROCEDURE — 99213 OFFICE O/P EST LOW 20 MIN: CPT | Performed by: NURSE PRACTITIONER

## 2024-11-25 PROCEDURE — 77063 BREAST TOMOSYNTHESIS BI: CPT

## 2024-11-25 PROCEDURE — G8484 FLU IMMUNIZE NO ADMIN: HCPCS | Performed by: NURSE PRACTITIONER

## 2024-11-25 PROCEDURE — 3017F COLORECTAL CA SCREEN DOC REV: CPT | Performed by: NURSE PRACTITIONER

## 2024-11-25 PROCEDURE — G8420 CALC BMI NORM PARAMETERS: HCPCS | Performed by: NURSE PRACTITIONER

## 2024-11-25 PROCEDURE — 1036F TOBACCO NON-USER: CPT | Performed by: NURSE PRACTITIONER

## 2024-11-25 PROCEDURE — G8427 DOCREV CUR MEDS BY ELIG CLIN: HCPCS | Performed by: NURSE PRACTITIONER

## 2024-11-25 NOTE — PROGRESS NOTES
of breast awareness including the importance and technique of self breast exams  Per request, we reviewed indications for HRT, and some risks vs benefits; recommend discussing with gyn.  Referral for JAMIN Abdul  Most recent imaging was reviewed and the results were discussed with the patient, all questions answered  Education provided for Healthy Lifestyle Recommendations: healthy diet, routine exercise, weight control, decreased alcohol consumption.  Follow up after mammogram       KATIA Chappell-CNP  [facility]   Surgical Breast Oncology   [phone]    All of the patient's questions were answered at this time however, she was encouraged to call the office with any further inquiries.    Approximately 20 minutes of time were spent in preparation, direct patient contact, counseling, care coordination, documentation and activities otherwise related to this encounter.

## 2025-07-09 ENCOUNTER — OFFICE VISIT (OUTPATIENT)
Age: 55
End: 2025-07-09
Payer: COMMERCIAL

## 2025-07-09 DIAGNOSIS — L57.8 DIFFUSE PHOTODAMAGE OF SKIN: Primary | ICD-10-CM

## 2025-07-09 DIAGNOSIS — D23.9 ANGIOKERATOMA: ICD-10-CM

## 2025-07-09 DIAGNOSIS — L72.0 EPIDERMAL CYST: ICD-10-CM

## 2025-07-09 PROCEDURE — 1036F TOBACCO NON-USER: CPT | Performed by: DERMATOLOGY

## 2025-07-09 PROCEDURE — 3017F COLORECTAL CA SCREEN DOC REV: CPT | Performed by: DERMATOLOGY

## 2025-07-09 PROCEDURE — G8420 CALC BMI NORM PARAMETERS: HCPCS | Performed by: DERMATOLOGY

## 2025-07-09 PROCEDURE — G8427 DOCREV CUR MEDS BY ELIG CLIN: HCPCS | Performed by: DERMATOLOGY

## 2025-07-09 PROCEDURE — 99214 OFFICE O/P EST MOD 30 MIN: CPT | Performed by: DERMATOLOGY

## 2025-07-09 RX ORDER — TRETINOIN 0.5 MG/G
CREAM TOPICAL
Qty: 20 G | Refills: 4 | Status: SHIPPED | OUTPATIENT
Start: 2025-07-09

## 2025-07-09 NOTE — PROGRESS NOTES
[facility] Dermatology  Kelly Meraz M.D.  [phone]       Ifrah Nayak  1970    54 y.o. female     Date of Visit: 7/9/2025    Chief Complaint:   Chief Complaint   Patient presents with    Skin Lesion        I was asked to see this patient by Dr. Rojo ref. provider found.    History of Present Illness:  1.  Urgent appointment-new dark papule left labia.  Has an epidermal cyst on her left labia that she evaluates regularly, noted the new dark papule relatively recently-in the last 1 to 2 months.  Asymptomatic.    Cyst also asymptomatic.  Not becoming inflamed or draining    Photodamage-progressive freckling and lentigines of sun exposed areas.  Uses tretinoin 0.05% cream approximately once per week.        4/25 TBSE OSH Derm    4/24 1 son graduating from college this year-Will attend [employer]     No previous personal  history of skin cancer.  Wears hats and sunscreen.     Mother with a history of nonmelanoma skin cancer     12/22 epidermal cyst right axilla incision and drainage, doxycycline    Review of Systems:  Constitutional: Reports general sense of well-being       Past Medical History, Surgical History, Family History, Medications and Allergies reviewed.    Social History:   Social History     Socioeconomic History    Marital status:      Spouse name: Not on file    Number of children: Not on file    Years of education: Not on file    Highest education level: Not on file   Occupational History    Not on file   Tobacco Use    Smoking status: Never    Smokeless tobacco: Never   Vaping Use    Vaping status: Never Used   Substance and Sexual Activity    Alcohol use: Yes     Comment: socially    Drug use: Never    Sexual activity: Not on file   Other Topics Concern    Not on file   Social History Narrative    Not on file     Social Drivers of Health     Financial Resource Strain: Not on file   Food Insecurity: Not on file   Transportation Needs: Not on file   Physical Activity: Not on file

## (undated) DEVICE — SNARE VASC L240CM LOOP W10MM SHTH DIA2.4MM RND STIFF CLD

## (undated) DEVICE — FORCEPS BX L240CM JAW DIA2.4MM ORNG L CAP W/ NDL DISP RAD

## (undated) DEVICE — TRAP SPEC RETRV CLR PLAS POLYP IN LN SUCT QUIK CTCH